# Patient Record
Sex: MALE | Race: WHITE | Employment: OTHER | URBAN - METROPOLITAN AREA
[De-identification: names, ages, dates, MRNs, and addresses within clinical notes are randomized per-mention and may not be internally consistent; named-entity substitution may affect disease eponyms.]

---

## 2018-08-16 ENCOUNTER — HOSPITAL ENCOUNTER (OUTPATIENT)
Dept: PREADMISSION TESTING | Age: 68
Discharge: HOME | End: 2018-08-16
Payer: MEDICARE

## 2018-08-16 DIAGNOSIS — G56.01 CARPAL TUNNEL SYNDROME ON RIGHT: ICD-10-CM

## 2018-08-16 LAB
ATRIAL RATE: 64 BPM
CALCULATED P AXIS, ECG09: 29 DEGREES
CALCULATED R AXIS, ECG10: 50 DEGREES
CALCULATED T AXIS, ECG11: 69 DEGREES
DIAGNOSIS, 93000: NORMAL
HCT VFR BLD AUTO: 41.5 % (ref 36–48)
HGB BLD-MCNC: 13.9 G/DL (ref 13–16)
P-R INTERVAL, ECG05: 204 MS
Q-T INTERVAL, ECG07: 402 MS
QRS DURATION, ECG06: 88 MS
QTC CALCULATION (BEZET), ECG08: 414 MS
VENTRICULAR RATE, ECG03: 64 BPM

## 2018-08-16 PROCEDURE — 93005 ELECTROCARDIOGRAM TRACING: CPT

## 2018-08-16 PROCEDURE — 85018 HEMOGLOBIN: CPT | Performed by: ORTHOPAEDIC SURGERY

## 2018-08-16 PROCEDURE — 36415 COLL VENOUS BLD VENIPUNCTURE: CPT | Performed by: ORTHOPAEDIC SURGERY

## 2018-08-17 LAB
FAX TO INFO,FAXT: NORMAL
FAX TO NUMBER,FAXN: NORMAL

## 2019-12-17 ENCOUNTER — HOSPITAL ENCOUNTER (OUTPATIENT)
Dept: PREADMISSION TESTING | Age: 69
Discharge: HOME | End: 2019-12-17
Payer: MEDICARE

## 2019-12-17 LAB
HCT VFR BLD AUTO: 43.7 % (ref 36–48)
HGB BLD-MCNC: 14.3 G/DL (ref 13–16)

## 2019-12-17 PROCEDURE — 85018 HEMOGLOBIN: CPT

## 2019-12-17 PROCEDURE — 36415 COLL VENOUS BLD VENIPUNCTURE: CPT

## 2019-12-17 PROCEDURE — 93005 ELECTROCARDIOGRAM TRACING: CPT

## 2019-12-18 LAB
ATRIAL RATE: 66 BPM
CALCULATED P AXIS, ECG09: 64 DEGREES
CALCULATED R AXIS, ECG10: -31 DEGREES
CALCULATED T AXIS, ECG11: 54 DEGREES
DIAGNOSIS, 93000: NORMAL
P-R INTERVAL, ECG05: 202 MS
Q-T INTERVAL, ECG07: 406 MS
QRS DURATION, ECG06: 90 MS
QTC CALCULATION (BEZET), ECG08: 425 MS
VENTRICULAR RATE, ECG03: 66 BPM

## 2020-08-19 ENCOUNTER — HOSPITAL ENCOUNTER (OUTPATIENT)
Dept: LAB | Age: 70
Discharge: HOME | End: 2020-08-19
Payer: MEDICARE

## 2020-08-19 ENCOUNTER — HOSPITAL ENCOUNTER (OUTPATIENT)
Dept: NON INVASIVE DIAGNOSTICS | Age: 70
Discharge: HOME | End: 2020-08-19
Payer: MEDICARE

## 2020-08-19 DIAGNOSIS — M65.312 TRIGGER THUMB OF LEFT HAND: ICD-10-CM

## 2020-08-19 LAB
ALBUMIN SERPL-MCNC: 3.7 G/DL (ref 3.4–5)
ALBUMIN/GLOB SERPL: 1.4 {RATIO} (ref 0.8–1.7)
ALP SERPL-CCNC: 60 U/L (ref 45–117)
ALT SERPL-CCNC: 26 U/L (ref 16–61)
ANION GAP SERPL CALC-SCNC: 4 MMOL/L (ref 3–18)
AST SERPL-CCNC: 22 U/L (ref 10–38)
ATRIAL RATE: 64 BPM
BASOPHILS # BLD: 0 K/UL (ref 0–0.1)
BASOPHILS NFR BLD: 1 % (ref 0–2)
BILIRUB SERPL-MCNC: 0.7 MG/DL (ref 0.2–1)
BUN SERPL-MCNC: 22 MG/DL (ref 7–18)
BUN/CREAT SERPL: 25 (ref 12–20)
CALCIUM SERPL-MCNC: 9.2 MG/DL (ref 8.5–10.1)
CALCULATED P AXIS, ECG09: 59 DEGREES
CALCULATED R AXIS, ECG10: 32 DEGREES
CALCULATED T AXIS, ECG11: 55 DEGREES
CHLORIDE SERPL-SCNC: 111 MMOL/L (ref 100–111)
CO2 SERPL-SCNC: 28 MMOL/L (ref 21–32)
CREAT SERPL-MCNC: 0.89 MG/DL (ref 0.6–1.3)
DIAGNOSIS, 93000: NORMAL
DIFFERENTIAL METHOD BLD: ABNORMAL
EOSINOPHIL # BLD: 0.1 K/UL (ref 0–0.4)
EOSINOPHIL NFR BLD: 1 % (ref 0–5)
ERYTHROCYTE [DISTWIDTH] IN BLOOD BY AUTOMATED COUNT: 12.5 % (ref 11.6–14.5)
GLOBULIN SER CALC-MCNC: 2.6 G/DL (ref 2–4)
GLUCOSE SERPL-MCNC: 93 MG/DL (ref 74–99)
HCT VFR BLD AUTO: 41 % (ref 36–48)
HGB BLD-MCNC: 13.5 G/DL (ref 13–16)
LYMPHOCYTES # BLD: 1.8 K/UL (ref 0.9–3.6)
LYMPHOCYTES NFR BLD: 27 % (ref 21–52)
MCH RBC QN AUTO: 31.3 PG (ref 24–34)
MCHC RBC AUTO-ENTMCNC: 32.9 G/DL (ref 31–37)
MCV RBC AUTO: 95.1 FL (ref 74–97)
MONOCYTES # BLD: 0.8 K/UL (ref 0.05–1.2)
MONOCYTES NFR BLD: 13 % (ref 3–10)
NEUTS SEG # BLD: 3.9 K/UL (ref 1.8–8)
NEUTS SEG NFR BLD: 58 % (ref 40–73)
P-R INTERVAL, ECG05: 208 MS
PLATELET # BLD AUTO: 187 K/UL (ref 135–420)
PMV BLD AUTO: 9.2 FL (ref 9.2–11.8)
POTASSIUM SERPL-SCNC: 4.1 MMOL/L (ref 3.5–5.5)
PROT SERPL-MCNC: 6.3 G/DL (ref 6.4–8.2)
Q-T INTERVAL, ECG07: 400 MS
QRS DURATION, ECG06: 92 MS
QTC CALCULATION (BEZET), ECG08: 412 MS
RBC # BLD AUTO: 4.31 M/UL (ref 4.7–5.5)
SODIUM SERPL-SCNC: 143 MMOL/L (ref 136–145)
VENTRICULAR RATE, ECG03: 64 BPM
WBC # BLD AUTO: 6.6 K/UL (ref 4.6–13.2)

## 2020-08-19 PROCEDURE — 93005 ELECTROCARDIOGRAM TRACING: CPT

## 2020-08-19 PROCEDURE — 85025 COMPLETE CBC W/AUTO DIFF WBC: CPT

## 2020-08-19 PROCEDURE — 36415 COLL VENOUS BLD VENIPUNCTURE: CPT

## 2020-08-19 PROCEDURE — 80053 COMPREHEN METABOLIC PANEL: CPT

## 2021-04-25 NOTE — H&P
Assessment/Plan  # Detail Type Description    1. Assessment Encounter for screening for malignant neoplasm of colon (Z12.11). Patient Plan I have discussed the risks, benefits and alternatives of the procedure to the patient including bleeding, infection, bowel prep, perforation missed lesions and incomplete procedure. They understand and wish to proceed         2. Assessment Body mass index (BMI) 26.0-26.9, adult (H62.11). Plan Orders Today's instructions / counseling include(s) Lifestyle education regarding diet. Giving encouragement to exercise              This 80year old male presents for Colon Cancer Screen. History of Present Illness  1. Colon Cancer Screen   Prior screening:  colonoscopy. Denies risk factors. Pertinent negatives include abdominal pain, change in bowel habits, change in stool caliber, constipation, decreased appetite, diarrhea, melena, nausea, rectal bleeding, vomiting, weight gain and weight loss. Additional information: No family history of colon cancer. Problem List  Problem List reviewed. Problem Description Onset Date Chronic Clinical Status Notes   Migraine  Y     Rosacea  Y         Past Medical/Surgical History   (Detailed)  Disease/disorder Onset Date Management Date Comments     Arthroscopy, knee       lipoma removal back       Carpal tunnel release 2018    Headache, migraine           Family History   (Detailed)    Relationship Family Member Name  Age at Death Condition Onset Age Cause of Death   Brother    Diabetes mellitus type 2  N   Father    Cancer, brain 64 Y   Mother    Diabetes mellitus type 2  N   Mother  Y  Stroke 78 Y   Sister    Diabetes mellitus type 2  N     Social History  (Detailed)  The patient is right-handed. Preferred language is [place]. Language spoken at home is [place]. Born in [facility]. Education/Employment/Occupation  The patient has a(n) graduate school education. Earned a Master's degree.   The degree was earned in [place]. Marital Status/Family/Social Support  Marital status:      Children  Has children:    IRIS Chandra 46 status is stable/permanent. Smoking status: Never smoker. Alcohol  There is a history of alcohol use. Type: Beer and liquor. 4 drinks consumed weekly. Last alcoholic drink was last week. Caffeine  The patient uses caffeine: coffee - 2-3 cups a day. Lifestyle  Moderate activity level. Exercise includes walking. Exercises daily. Diet  regular. Home Environment/Safety  The home has smoke detectors. Uses seat belts.  Experience  Patient has no  experience. Medications (active prior to today)  Medication Instructions Start Date Stop Date Refilled Elsewhere   Imitrex 100 mg tablet take 1 tablet by oral route once with fluids at the onset of a migraine, may repeat in 2 hours if needed, not to exceed 200mg in 24hrs 03/08/2019 03/08/2019 N   Ambien 10 mg tablet take 1 Tablet by Oral route  at bedtime as needed for insomnia 08/29/2019   N   EpiPen 2-Kendell 0.3 mg/0.3 mL injection, auto-injector inject (0.3MG)  by intramuscular route once as needed for severe allergic reaction 11/20/2019   N       Medication Reconciliation  Medications reconciled today. Medication Reviewed  Adherence Medication Name Sig Desc Elsewhere Status   taking as directed Ambien 10 mg tablet take 1 Tablet by Oral route  at bedtime as needed for insomnia N Verified   taking as directed Imitrex 100 mg tablet take 1 tablet by oral route once with fluids at the onset of a migraine, may repeat in 2 hours if needed, not to exceed 200mg in 24hrs N Verified   taking as directed EpiPen 2-Kendell 0.3 mg/0.3 mL injection, auto-injector inject (0.3MG)  by intramuscular route once as needed for severe allergic reaction N Verified     Allergies  Ingredient Reaction (Severity) Medication Name Comment   NO KNOWN ALLERGIES        Reviewed, no changes.     Review of Systems  System Neg/Pos Details   Constitutional Negative Fever, Night sweats, Weight gain and Weight loss. ENMT Negative Hearing loss, Tinnitus, Vertigo and Voice change. Eyes Negative Diplopia and Vision loss. Respiratory Negative Asthma, Cough, Dyspnea, Hemoptysis, Known TB exposure and Wheezing. Cardio Negative Chest pain, Claudication, Edema, Irregular heartbeat/palpitations and Thrombophlebitis. GI Negative Abdominal pain, Bloating, Change in bowel habits, Change in stool caliber, Constipation, Decreased appetite, Diarrhea, Dysphagia, Hemorrhoids, Jaundice, Melena, Nausea, Rectal bleeding, Reflux and Vomiting.  Negative Dysuria, Nocturia, Passage stone/gravel and Urinary incontinence. Endocrine Negative Cold intolerance and Goiter. Neuro Negative Focal weakness, Headache, Paresthesia, Seizures and Syncope. Integumentary Negative Change in shape/size of mole(s) and Skin lesion. MS Negative Back pain, Bone/joint symptoms and Muscle weakness. Hema/Lymph Negative Easy bleeding and Easy bruising. Allergic/Immuno Negative Contact allergy and Contact dermatitis. Vital Signs   Height  Time ft in cm Last Measured Height Position   10:12 AM 5.0 8.00 172.72 03/06/2017 Standing     Weight/BSA/BMI  Time lb oz kg Context BMI kg/m2 BSA m2   10:12 .60  78.290 dressed with shoes 26.24 1.94     Blood Pressure  Time BP mm/Hg Position Side Site Method Cuff Size   10:12 /88 sitting left brachial automatic adult     Temperature/Pulse/Respiration  Time Temp F Temp C Temp Site Pulse/min Pattern Resp/ min   10:12 AM 96.80 36.00 temp strip 83 regular      Pulse Oximetry/FIO2  Time Pulse Ox (Rest %) Pulse Ox (Amb %) O2 Sat O2 L/Min Timing FiO2 % L/min Delivery Method Finger Probe   10:12 AM 98  RA      R Index     Measured by  Time Measured by   10:12 AM Eddie Haq     Physical Exam  Exam Findings Details   Constitutional Normal Well developed.    Eyes Normal Conjunctiva - Right: Normal, Left: Normal. Pupil - Right: Normal, Left: Normal.   Ears Normal Inspection - Right: Normal, Left: Normal.   Neck Exam Normal Inspection - Normal. Palpation - Normal. Thyroid gland - Normal.   Lymph Detail Normal No cervical, supraclavicular, or axillary adenopathy. Respiratory Normal Inspection - Normal. Auscultation - Normal. Effort - Normal.   Cardiovascular Normal Regular rate and rhythm. No murmurs, gallops, or rubs. Vascular Normal Capillary refill - Less than 2 seconds. Abdomen Normal Inspection - Normal. No abdominal tenderness. No hepatic enlargement. No spleen enlargement. No hernia. Skin Normal Inspection - Normal.   Musculoskeletal Normal Visual overview of all four extremities is normal.   Extremity Normal No edema. Neurological Normal Memory - Normal. Cranial nerves - Cranial nerves I grossly intact, Cranial nerves II through XII grossly intact. Psychiatric Normal Orientation - Oriented to time, place, person & situation. Appropriate mood and affect. Normal insight. Normal judgment. Immunizations Entered by History  Date Immunization   3/5/2021 12:00:00 AM SARS-COV-2 (COVID-19) vaccine, mRNA, spike protein, LNP, preservative free, 100 mcg/0.5mL dose   2/3/2021 12:00:00 AM SARS-COV-2 (COVID-19) vaccine, mRNA, spike protein, LNP, preservative free, 100 mcg/0.5mL dose    influenza, high-dose seasonal, quadrivalent, . 7mL dose, preservative free   10/26/2019 12:00:00 AM influenza, injectable, quadrivalent, contains preservative   11/3/2015 12:00:00 AM Flu (3 yrs or older)   6/16/2016 12:52:34 PM yellow fever vaccine   6/16/2016 12:52:59 PM typhoid Vi capsular polysaccharide vaccine   6/16/2016 12:53:25 PM Pneumo (2 yrs or older) (PPV23)         Medications (added, continued, or stopped this visit)  Start Date Medication Directions PRN Status PRN Reason Instruction Stop Date   08/29/2019 Ambien 10 mg tablet take 1 Tablet by Oral route  at bedtime as needed for insomnia N      11/20/2019 EpiPen 2-Kendell 0.3 mg/0.3 mL injection, auto-injector inject (0.3MG)  by intramuscular route once as needed for severe allergic reaction N      03/08/2019 Imitrex 100 mg tablet take 1 tablet by oral route once with fluids at the onset of a migraine, may repeat in 2 hours if needed, not to exceed 200mg in 24hrs N        Active Patient Care Team Members  Name Contact Agency Type Support Role Relationship Active Date Inactive Date Specialty   Guido Cristina   Patient provider PCP   Family Practice       Provider:    Madelaine Das MD 04/13/2021 12:27 PM     Document generated by: Deng Manning 04/13/2021 12:27 PM         Electronically signed by Deng Manning MD on 04/13/2021 01:37 PM

## 2021-04-26 ENCOUNTER — HOSPITAL ENCOUNTER (OUTPATIENT)
Dept: PREADMISSION TESTING | Age: 71
Discharge: HOME | End: 2021-04-26
Payer: MEDICARE

## 2021-04-26 PROCEDURE — U0003 INFECTIOUS AGENT DETECTION BY NUCLEIC ACID (DNA OR RNA); SEVERE ACUTE RESPIRATORY SYNDROME CORONAVIRUS 2 (SARS-COV-2) (CORONAVIRUS DISEASE [COVID-19]), AMPLIFIED PROBE TECHNIQUE, MAKING USE OF HIGH THROUGHPUT TECHNOLOGIES AS DESCRIBED BY CMS-2020-01-R: HCPCS

## 2021-04-27 LAB — SARS-COV-2, COV2NT: NOT DETECTED

## 2021-04-30 ENCOUNTER — ANESTHESIA EVENT (OUTPATIENT)
Dept: ENDOSCOPY | Age: 71
End: 2021-04-30
Payer: MEDICARE

## 2021-04-30 ENCOUNTER — HOSPITAL ENCOUNTER (OUTPATIENT)
Age: 71
Setting detail: OUTPATIENT SURGERY
Discharge: HOME | End: 2021-04-30
Attending: SURGERY | Admitting: SURGERY
Payer: MEDICARE

## 2021-04-30 ENCOUNTER — ANESTHESIA (OUTPATIENT)
Dept: ENDOSCOPY | Age: 71
End: 2021-04-30
Payer: MEDICARE

## 2021-04-30 VITALS
SYSTOLIC BLOOD PRESSURE: 118 MMHG | TEMPERATURE: 97.8 F | DIASTOLIC BLOOD PRESSURE: 76 MMHG | HEART RATE: 76 BPM | RESPIRATION RATE: 18 BRPM | HEIGHT: 68 IN | OXYGEN SATURATION: 100 % | BODY MASS INDEX: 25.01 KG/M2 | WEIGHT: 165 LBS

## 2021-04-30 PROCEDURE — 88305 TISSUE EXAM BY PATHOLOGIST: CPT

## 2021-04-30 PROCEDURE — 74011000250 HC RX REV CODE- 250: Performed by: ANESTHESIOLOGY

## 2021-04-30 PROCEDURE — 74011250636 HC RX REV CODE- 250/636: Performed by: ANESTHESIOLOGY

## 2021-04-30 PROCEDURE — 77030039961 HC KT CUST COLON BSC -D: Performed by: SURGERY

## 2021-04-30 PROCEDURE — 2709999900 HC NON-CHARGEABLE SUPPLY: Performed by: SURGERY

## 2021-04-30 PROCEDURE — 77030021593 HC FCPS BIOP ENDOSC BSC -A: Performed by: SURGERY

## 2021-04-30 PROCEDURE — 77030040361 HC SLV COMPR DVT MDII -B: Performed by: SURGERY

## 2021-04-30 PROCEDURE — 76060000031 HC ANESTHESIA FIRST 0.5 HR: Performed by: SURGERY

## 2021-04-30 PROCEDURE — 76040000019: Performed by: SURGERY

## 2021-04-30 RX ORDER — SODIUM CHLORIDE 9 MG/ML
125 INJECTION, SOLUTION INTRAVENOUS CONTINUOUS
Status: DISCONTINUED | OUTPATIENT
Start: 2021-04-30 | End: 2021-04-30 | Stop reason: HOSPADM

## 2021-04-30 RX ORDER — LIDOCAINE HYDROCHLORIDE 20 MG/ML
INJECTION, SOLUTION EPIDURAL; INFILTRATION; INTRACAUDAL; PERINEURAL AS NEEDED
Status: DISCONTINUED | OUTPATIENT
Start: 2021-04-30 | End: 2021-04-30 | Stop reason: HOSPADM

## 2021-04-30 RX ORDER — ACETAMINOPHEN 325 MG/1
650 TABLET ORAL
COMMUNITY

## 2021-04-30 RX ORDER — PROPOFOL 10 MG/ML
INJECTION, EMULSION INTRAVENOUS AS NEEDED
Status: DISCONTINUED | OUTPATIENT
Start: 2021-04-30 | End: 2021-04-30 | Stop reason: HOSPADM

## 2021-04-30 RX ADMIN — PROPOFOL 20 MG: 10 INJECTION, EMULSION INTRAVENOUS at 09:46

## 2021-04-30 RX ADMIN — PROPOFOL 50 MG: 10 INJECTION, EMULSION INTRAVENOUS at 09:43

## 2021-04-30 RX ADMIN — PROPOFOL 20 MG: 10 INJECTION, EMULSION INTRAVENOUS at 09:49

## 2021-04-30 RX ADMIN — LIDOCAINE HYDROCHLORIDE 20 MG: 20 INJECTION, SOLUTION INTRAVENOUS at 09:43

## 2021-04-30 RX ADMIN — PROPOFOL 20 MG: 10 INJECTION, EMULSION INTRAVENOUS at 09:53

## 2021-04-30 NOTE — OP NOTES
Colonoscopy Procedure Note    Indications: Routine screening    Anesthesia/Sedation: MAC anesthesia Propofol    Pre-Procedure Exam:  Airway: clear   Heart: normal S1and S2    Lungs: clear bilateral  Abdomen: soft, nontender, bowel sounds present and normal in all quadrants   Mental Status: awake, alert, and oriented to person, place, and time      Procedure in Detail:  Informed consent was obtained for the procedure, including sedation. Risks of perforation, hemorrhage, adverse drug reaction, and aspiration were discussed. The patient was placed in the left lateral decubitus position. Based on the pre-procedure assessment, including review of the patient's medical history, medications, allergies, and review of systems, he had been deemed to be an appropriate candidate for moderate sedation; he was therefore sedated with the medications listed above. The patient was monitored continuously with ECG tracing, pulse oximetry, blood pressure monitoring, and direct observations. A rectal examination was performed. The [id number] was inserted into the rectum and advanced under direct vision to the cecum, which was identified by the ileocecal valve and appendiceal orifice. The quality of the colonic preparation was good. A careful inspection was made as the colonoscope was withdrawn, including a retroflexed view of the rectum; findings and interventions are described below. Appropriate photodocumentation was obtained. Findings:   Rectum:   Normal  Sigmoid:   Normal  Descending Colon:     - Protruding lesions:     -Sessile Polyp(s) size 2 mm removed by polypectomy (cold biopsy)  Transverse Colon:   Normal  Ascending Colon:   Normal  Cecum:   Normal  Terminal Ileum:   Not entered      Specimens: Specimens were collected and sent to pathology. EBL: Minimal    Complications: None; patient tolerated the procedure well. Attending Attestation: I performed the procedure.     Assistant:  None    Implants:  * No implants in log *    Recommendations:   - Await pathology.     Signed By: Haley Aguilar MD                       4/30/2021

## 2021-04-30 NOTE — INTERVAL H&P NOTE
Update History & Physical    The Patient's History and Physical of April 30,    was reviewed with the patient and I examined the patient. There was no change. The surgical site was confirmed by the patient and me. Plan:  The risk, benefits, expected outcome, and alternative to the recommended procedure have been discussed with the patient. Patient understands and wants to proceed with the procedure.     Electronically signed by Aniket Chung MD on 4/30/2021 at 9:31 AM

## 2021-04-30 NOTE — ANESTHESIA POSTPROCEDURE EVALUATION
Procedure(s):  COLONOSCOPY WITH MAC; POLYPECTOMY. MAC    Anesthesia Post Evaluation        Comments: Post-Anesthesia Evaluation and Assessment    Cardiovascular Function/Vital Signs  /76   Pulse 76   Temp 36.6 °C (97.8 °F)   Resp 18   Ht 5' 8\" (1.727 m)   Wt 74.8 kg (165 lb)   SpO2 100%   BMI 25.09 kg/m²     Patient is status post Procedure(s):  COLONOSCOPY WITH MAC; POLYPECTOMY. Nausea/Vomiting: Controlled. Postoperative hydration reviewed and adequate. Pain:  Pain Scale 1: Numeric (0 - 10) (04/30/21 1015)  Pain Intensity 1: 0 (04/30/21 1015)   Managed. Neurological Status: At baseline. Mental Status and Level of Consciousness: Arousable. Pulmonary Status:   O2 Device: None (Room air) (04/30/21 1015)   Adequate oxygenation and airway patent. Complications related to anesthesia: None    Post-anesthesia assessment completed. No concerns. Patient has met all discharge requirements.     Signed By: Agata Harrison MD    April 30, 2021                   INITIAL Post-op Vital signs:   Vitals Value Taken Time   /76 04/30/21 1015   Temp 36.6 °C (97.8 °F) 04/30/21 1015   Pulse 76 04/30/21 1015   Resp 18 04/30/21 1015   SpO2 100 % 04/30/21 1015

## 2021-04-30 NOTE — ANESTHESIA PREPROCEDURE EVALUATION
Relevant Problems   No relevant active problems       Anesthetic History   No history of anesthetic complications            Review of Systems / Medical History  Patient summary reviewed, nursing notes reviewed and pertinent labs reviewed    Pulmonary  Within defined limits                 Neuro/Psych   Within defined limits           Cardiovascular                  Exercise tolerance: >4 METS     GI/Hepatic/Renal  Within defined limits              Endo/Other  Within defined limits           Other Findings              Physical Exam    Airway  Mallampati: II  TM Distance: 4 - 6 cm  Neck ROM: normal range of motion, short neck   Mouth opening: Diminished (comment)     Cardiovascular               Dental    Dentition: Bridges     Pulmonary                 Abdominal  GI exam deferred       Other Findings            Anesthetic Plan    ASA: 1  Anesthesia type: MAC          Induction: Intravenous  Anesthetic plan and risks discussed with: Patient

## 2021-04-30 NOTE — BRIEF OP NOTE
Brief Postoperative Note    Patient: Caty Lara  YOB: 1950  MRN: [id number]    Date of Procedure: 4/30/2021     Pre-Op Diagnosis: COLON CANCER SCREENING    Post-Op Diagnosis: Same as preoperative diagnosis. Procedure(s):  COLONOSCOPY WITH MAC; POLYPECTOMY    Surgeon(s):   Gary Toure MD    Surgical Assistant: None    Anesthesia: MAC     Estimated Blood Loss (mL): Minimal    Complications: None    Specimens:   ID Type Source Tests Collected by Time Destination   1 : POLYP Preservative Colon, Descending  Gary Toure MD 4/30/2021 9947 Pathology        Implants: * No implants in log *    Drains: * No LDAs found *    Findings: polyp    Electronically Signed by Brian Xiong MD on 4/30/2021 at 10:00 AM

## 2021-04-30 NOTE — DISCHARGE INSTRUCTIONS
Nasim James  [id number]  1950    COLON DISCHARGE INSTRUCTIONS    Discomfort:  Redness at IV site- apply warm compress to area; if redness or soreness persist- contact your physician  There may be a slight amount of blood passed from the rectum  Gaseous discomfort- walking, belching will help relieve any discomfort  You should not operate a vehicle for 12 hours  You should not engage in an occupation involving machinery or appliances for rest of today  You may not drink alcoholic beverages for at least 12 hours  Avoid making any critical decisions for at least 24 hour  DIET:   High fiber diet. - however -  remember your colon is empty and a heavy meal will produce gas. Avoid these foods:  vegetables, fried / greasy foods, carbonated drinks for today     ACTIVITY:  You may resume your normal daily activities it is recommended that you spend the remainder of the day resting -  avoid any strenuous activity. CALL M.D. ANY SIGN OF:   Increasing pain, nausea, vomiting  Abdominal distension (swelling)  New increased bleeding (oral or rectal)  Fever (chills)  Pain in chest area  Bloody discharge from nose or mouth  Shortness of breath      Follow-up Instructions:   Dr Trevon Dailey will call you in one to two weeks. If you do not hear from him, please call his office [phone].                           Nasim James  [id number]  1950        DISCHARGE SUMMARY from Nurse    The following personal items collected during your admission are returned to you:   Dental Appliance: Dental Appliances: None  Vision: Visual Aid: Glasses, With patient  Hearing Aid:    Jewelry:    Clothing:    Other Valuables:    Valuables sent to safe:        DISCHARGE SUMMARY from Nurse    PATIENT INSTRUCTIONS:    After general anesthesia or intravenous sedation, for 24 hours or while taking prescription Narcotics:  · Limit your activities  · Do not drive and operate hazardous machinery  · Do not make important personal or business decisions  · Do  not drink alcoholic beverages  · If you have not urinated within 8 hours after discharge, please contact your surgeon on call. Report the following to your surgeon:  · Excessive pain, swelling, redness or odor of or around the surgical area  · Temperature over 100.5  · Nausea and vomiting lasting longer than 4 hours or if unable to take medications  · Any signs of decreased circulation or nerve impairment to extremity: change in color, persistent  numbness, tingling, coldness or increase pain  · Any questions    What to do at Home:  Recommended activity: AS ABOVE,     If you experience any of the following symptoms AS ABOVE, please follow up with DR. BURGESS. *  Please give a list of your current medications to your Primary Care Provider. *  Please update this list whenever your medications are discontinued, doses are      changed, or new medications (including over-the-counter products) are added. *  Please carry medication information at all times in case of emergency situations. These are general instructions for a healthy lifestyle:    No smoking/ No tobacco products/ Avoid exposure to second hand smoke  Surgeon General's Warning:  Quitting smoking now greatly reduces serious risk to your health. Obesity, smoking, and sedentary lifestyle greatly increases your risk for illness    A healthy diet, regular physical exercise & weight monitoring are important for maintaining a healthy lifestyle    You may be retaining fluid if you have a history of heart failure or if you experience any of the following symptoms:  Weight gain of 3 pounds or more overnight or 5 pounds in a week, increased swelling in our hands or feet or shortness of breath while lying flat in bed. Please call your doctor as soon as you notice any of these symptoms; do not wait until your next office visit. The discharge information has been reviewed with the patient.   The patient verbalized understanding. Discharge medications reviewed with the patient and appropriate educational materials and side effects teaching were provided.   ___________________________________________________________________________________________________________________________________      Patient armband removed and shredded

## 2021-08-31 ENCOUNTER — TRANSCRIBE ORDER (OUTPATIENT)
Dept: REGISTRATION | Age: 71
End: 2021-08-31

## 2021-08-31 ENCOUNTER — HOSPITAL ENCOUNTER (OUTPATIENT)
Dept: LAB | Age: 71
Discharge: HOME | End: 2021-08-31
Payer: MEDICARE

## 2021-08-31 ENCOUNTER — HOSPITAL ENCOUNTER (OUTPATIENT)
Dept: NON INVASIVE DIAGNOSTICS | Age: 71
Discharge: HOME | End: 2021-08-31
Payer: MEDICARE

## 2021-08-31 DIAGNOSIS — E87.6 POTASSIUM (K) DEFICIENCY: ICD-10-CM

## 2021-08-31 DIAGNOSIS — Z01.812 BLOOD TESTS PRIOR TO TREATMENT OR PROCEDURE: ICD-10-CM

## 2021-08-31 DIAGNOSIS — M65.349: ICD-10-CM

## 2021-08-31 DIAGNOSIS — Z01.812 BLOOD TESTS PRIOR TO TREATMENT OR PROCEDURE: Primary | ICD-10-CM

## 2021-08-31 DIAGNOSIS — M65.349: Primary | ICD-10-CM

## 2021-08-31 DIAGNOSIS — G40.802: Primary | ICD-10-CM

## 2021-08-31 DIAGNOSIS — G40.802: ICD-10-CM

## 2021-08-31 LAB
ATRIAL RATE: 68 BPM
CALCULATED P AXIS, ECG09: 34 DEGREES
CALCULATED R AXIS, ECG10: -45 DEGREES
CALCULATED T AXIS, ECG11: 51 DEGREES
DIAGNOSIS, 93000: NORMAL
HCT VFR BLD AUTO: 42.7 % (ref 36–48)
HGB BLD-MCNC: 14.3 G/DL (ref 13–16)
P-R INTERVAL, ECG05: 206 MS
POTASSIUM SERPL-SCNC: 4 MMOL/L (ref 3.5–5.5)
Q-T INTERVAL, ECG07: 388 MS
QRS DURATION, ECG06: 80 MS
QTC CALCULATION (BEZET), ECG08: 412 MS
VENTRICULAR RATE, ECG03: 68 BPM

## 2021-08-31 PROCEDURE — 85018 HEMOGLOBIN: CPT

## 2021-08-31 PROCEDURE — 84132 ASSAY OF SERUM POTASSIUM: CPT

## 2021-08-31 PROCEDURE — 36415 COLL VENOUS BLD VENIPUNCTURE: CPT

## 2021-08-31 PROCEDURE — 93005 ELECTROCARDIOGRAM TRACING: CPT

## 2022-11-09 ENCOUNTER — HOSPITAL ENCOUNTER (OUTPATIENT)
Dept: PHYSICAL THERAPY | Age: 72
Discharge: HOME | End: 2022-11-09
Payer: MEDICARE

## 2022-11-09 PROCEDURE — 97110 THERAPEUTIC EXERCISES: CPT

## 2022-11-09 PROCEDURE — 97161 PT EVAL LOW COMPLEX 20 MIN: CPT

## 2022-11-09 NOTE — PROGRESS NOTES
PT DAILY TREATMENT NOTE/LUMBAR EVAL 10-18    Patient Name: Diane Mancera  Date:2022  : 1950  [x]  Patient  Verified  Payor: Mandeep Grew / Plan: [place] & [place] / Product Type: Medicare /    In [id number]  Out time:1020  Total Treatment Time (min): 50  Visit #: 1 of 16    Medicare/BCBS Only   Total Timed Codes (min):  23 1:1 Treatment Time:  50     Treatment Area: Other low back pain [M54.59]  SUBJECTIVE  Pain Level (0-10 scale): 0-1  []constant [x]intermittent []improving []worsening []no change since onset    Any medication changes, allergies to medications, adverse drug reactions, diagnosis change, or new procedure performed?: [x] No    [] Yes (see summary sheet for update)  Subjective functional status/changes:       Patient presents with c/o low back pain and bilateral radiculopathy, right more than left, after performing bird dog position in yoga class May 2022. Patient describes pain as sharp, stabbing, ache. Pain is worse in AM. Reports numbness/tingling in right LE and foot. Denies popping/clicking. Aggravating factors:rolling over in bed. Alleviating factors: stretch, yoga. Denies red flags: SOB, chest pain, dizziness/lightheadedness, blurred/double vision, HA, chills/fevers, night sweats, change in bowel/bladder control, abdominal pain, difficulty swallowing, slurred speech, unexplained weight gain/loss, nausea, vomiting. PMHx: migraines. Surgical Hx: carpal tunnel, trigger finger release. Social Hx: Lives with spouse in a two story home, work status: retired. PLOF: Yoga, regular exercise program, active life style.   Diagnostic Imaging: Bone spur presents at L5    OBJECTIVE/EXAMINATION    37 min [x]Eval                  []Re-Eval     23 min Therapeutic Exercise:  [x] See flow sheet :   Rationale: increase ROM, increase strength and decrease pain to improve the patients ability to complete ADLs          With   [x] TE   [] TA   [] neuro   [] other: Patient Education: [x] Review HEP [] Progressed/Changed HEP based on:   [] positioning   [] body mechanics   [] transfers   [] heat/ice application    [] other:      Physical Therapy Evaluation - Lumbar Spine    OBJECTIVE  Posture: Forward head and rounded shoulders    Gait:  [] Normal     [x] Abnormal: Patient ambulates with independence demonstrating an antalgic gait pattern. Active Movements: [] N/A   [] Too acute   [] Other:  ROM % AROM Comments:pain, area   Forward flexion 40-60 100    Extension 20-30 75    SB right 20-30 100    SB left 20-30 100    Rotation right 5-10 50    Rotation left 5-10 50      Repeated Movements   Effects on present pain: produces (VT), abolishes (A), increases (incr), decreases (decr), centralizes (C), peripheral (PH), no effect (NE)   Pre-Test Sx Flexion Repeated Flexion Extension Repeated Extension Repeated SBL Repeated SBR   Sitting          Standing  NE NE NE NE NE NE   Lying      N/A N/A     Neuro Screen [x] WNL    Dural Mobility:  SLR Supine: [] R    [] L    [] +    [x] -    Slump Test: [] R    [] L    [] +    [x] -      Palpation  [] Min  [x] Mod  [] Severe    Location:    Strength   L(0-5) R (0-5) N/T   Hip Flexion (L1,2) 4 4- []   Knee Extension (L3,4) 5 4+ []   Ankle Dorsiflexion (L4) 5 3+ []   Great Toe Extension (L5) 4+ 4- []   Ankle Plantarflexion (S1) 4 4- []   Knee Flexion (S1,2) 5 4+ []   Abdominals 4 4 []   Paraspinals 4 4 []   Back Rotators 4 4 []   Gluteus Santiago 4- 4- []   Hip Abduction 4- 4- []       Special Tests  Lumbar:  Lumb.  Compression: [] Pos  [x] Neg               Lumbar Distraction:   [] Pos  [x] Neg      Sacroiliac:  Gaenslen's: [] R    [] L    [] +    [x] -     Compression: [] +    [x] -     Gapping:  [] +    [x] -     Thigh Thrust: [] R    [] L    [] +    [x] -          Hip: Lora Redhead:  [] R    [] L    [] +    [x] -     Scour:  [] R    [] L    [] +    [x] -     Piriformis: [x] R    [] L    [x] +    [] -     Other tests/comments:  Patient presents with neurogenic weakness during dorsiflexion     Pain Level (0-10 scale) post treatment: 0    ASSESSMENT/Changes in Function:     Patient presents with c/o low back pain and bilateral radiculopathy, right more than left, after performing bird dog position in yoga class May 2022. He has reduced abdominal strength and bilateral LEs, right more than left. Weakness is present with right foot/ankle dorsiflexion and becomes progressively weaker when tested multiple times which may be consistent with neurogenic weakness. Patient has reduced balance and ambulates with an antalgic gait pattern. Patient presentation is consistent with lumbar radiculopathy. Patient will benefit from skilled PT services to modify and progress therapeutic interventions, address functional mobility deficits, address ROM deficits, address strength deficits, analyze and address soft tissue restrictions, analyze and cue movement patterns, analyze and modify body mechanics/ergonomics and assess and modify postural abnormalities to attain his goals.      [x]  See Plan of Care  []  See progress note/recertification  []  See Discharge Summary         Progress towards goals / Updated goals:  See POC    PLAN  []  Upgrade activities as tolerated     [x]  Continue plan of care  []  Update interventions per flow sheet       []  Discharge due to:_  []  Other:_      Dameon Link PT, DPT 11/9/2022  9:25 AM

## 2022-11-09 NOTE — PROGRESS NOTES
[facility] at [address] [place] [address]  Phone: [phone]   Fax: [phone]    Plan of Care/ Statement of Necessity for Physical Therapy Services     Patient name: Marisol Morillo Start of Care: 2022   Referral source: Cecil Parham MD : 1950    Medical Diagnosis: Other low back pain [M54.59]   Onset Date:May 2022    Treatment Diagnosis: Low back pain   Prior Hospitalization: see medical history Provider#: [id number]   Medications: Verified on Patient summary List    Comorbidities: Migraines   Prior Level of Function:  Yoga, regular exercise program, active life style    The Plan of Care and following information is based on the information from the initial evaluation. Assessment/ key information: Patient presents with c/o low back pain and bilateral radiculopathy, right more than left, after performing bird dog position in yoga class May 2022. He has reduced abdominal strength and bilateral LEs, right more than left. Weakness is present with right foot/ankle dorsiflexion and becomes progressively weaker when tested multiple times which may be consistent with neurogenic weakness. Patient has reduced balance and ambulates with an antalgic gait pattern. Patient presentation is consistent with lumbar radiculopathy. Patient will benefit from skilled PT services to modify and progress therapeutic interventions, address functional mobility deficits, address ROM deficits, address strength deficits, analyze and address soft tissue restrictions, analyze and cue movement patterns, analyze and modify body mechanics/ergonomics and assess and modify postural abnormalities to attain his goals.     Evaluation Complexity History MEDIUM  Complexity : 1-2 comorbidities / personal factors will impact the outcome/ POC ; Examination LOW Complexity : 1-2 Standardized tests and measures addressing body structure, function, activity limitation and / or participation in recreation  ;Presentation LOW Complexity : Stable, uncomplicated  ;Clinical Decision Making MEDIUM Complexity : FOTO score of 26-74  Overall Complexity Rating: LOW   Problem List: pain affecting function, decrease ROM, decrease strength, impaired gait/ balance, decrease ADL/ functional abilitiies, decrease activity tolerance, decrease flexibility/ joint mobility, and decrease transfer abilities   Treatment Plan may include any combination of the following: Therapeutic exercise, Neuromuscular reeducation, Manual therapy, Therapeutic activity, Self care/home management, Electric stim unattended , Gait training, Mechanical traction, and Electric stim attended  Patient / Family readiness to learn indicated by: asking questions, trying to perform skills, and interest  Persons(s) to be included in education: patient (P)  Barriers to Learning/Limitations: None  Measures taken if barriers to learning: NA  Patient Goal (s): Strengthen lower back, relieve L5 nerve, eliminate numbness/tingling  Patient Self Reported Health Status: excellent  Rehabilitation Potential: good    Short Term Goals: To be accomplished in 4 weeks:   Patient will report compliance with HEP 1x/day to aid in rehabilitation program.   Status at IE: Provided initial HEP   Current:Same as IE    Long Term Goals: To be accomplished in 8 weeks:   Patient will increase bilateral LE strength to 5/5 MMT throughout to aid in completion of ADLs. Status at IE:4-/5   Current:Same as IE     Patient will report pain no greater than 0-2/10 throughout entire day to aid in completion of ADLs. Status at IE:1-10/10   Current:Same as IE     Patient will perform 10 pain free kettle bell squats with 15lbs with good form/technique to aid in completion of ADLs. Status at [address] with sit to stand transfer   Current:Same as IE     Patient will improve FOTO score to 74 points to demonstrate improvement in functional status.    Status at IE:FOTO score = 67 (an established functional score where 100 = no disability)   Current: Same as IE    Frequency / Duration: Patient to be seen 2 times per week for 8 weeks. Patient/ Caregiver education and instruction: Diagnosis, prognosis, self care, activity modification, and exercises   [x]  Plan of care has been reviewed with PTA    Certification Period: 11/9/2022 - 2/7/2023    Osman Mercado PT, DPT 11/9/2022 3:09 PM  _____________________________________________________________________  I certify that the above Therapy Services are being furnished while the patient is under my care. I agree with the treatment plan and certify that this therapy is necessary.     Physician's Signature:____________Date:_________TIME:________                                      Ling Floyd MD    ** Signature, Date and Time must be completed for valid certification **    Please sign and return to   [facility] at [address]  Phone: [phone]   Fax: [phone]

## 2022-11-15 ENCOUNTER — HOSPITAL ENCOUNTER (OUTPATIENT)
Dept: PHYSICAL THERAPY | Age: 72
Discharge: HOME | End: 2022-11-15
Payer: MEDICARE

## 2022-11-15 PROCEDURE — 97530 THERAPEUTIC ACTIVITIES: CPT

## 2022-11-15 PROCEDURE — 97110 THERAPEUTIC EXERCISES: CPT

## 2022-11-15 PROCEDURE — 97112 NEUROMUSCULAR REEDUCATION: CPT

## 2022-11-15 NOTE — PROGRESS NOTES
PT DAILY TREATMENT NOTE - [facility]     Patient Name: Anuj Fenton  Date:11/15/2022  : 1950  [x]  Patient  Verified  Payor: Rk Olsen / Plan: [place] / Product Type: Medicare /    In ZFYI:[id number]  Out time:1238  Total Treatment Time (min): 53  Total Timed Codes (min): 53   1:1 Treatment Time ( [address] only): 53   Visit #: 2 of 16    Treatment Area: Other low back pain [M54.59]    SUBJECTIVE  Pain Level (0-10 scale): 1  Any medication changes, allergies to medications, adverse drug reactions, diagnosis change, or new procedure performed?: [x] No    [] Yes (see summary sheet for update)  Subjective functional status/changes:   [] No changes reported    Patient reports compliance with HEP but would like to review the program to ensure proper form. OBJECTIVE    30 min Therapeutic Exercise:  [x] See flow sheet :   Rationale: increase ROM, increase strength and decrease pain to improve the patients ability to complete ADLs    15 min Therapeutic Activity:  [x]  See flow sheet :   Rationale: increase ROM, increase strength and improve coordination  to improve the patients ability to complete ADLs     8 min Neuromuscular Re-education:  [x]  See flow sheet :   Rationale: improve coordination, improve balance and increase proprioception  to improve the patients ability to complete ADLs        With   [x] TE   [] TA   [] neuro   [] other: Patient Education: [x] Review HEP    [] Progressed/Changed HEP based on:   [] positioning   [] body mechanics   [] transfers   [] heat/ice application    [] other:      Other Objective/Functional Measures: NA     Pain Level (0-10 scale) post treatment: 0    ASSESSMENT/Changes in Function: Patient responds well to treatment session. Provided cues to promote proper  with PPT as patient was utilizing LEs to perform the exercise. He demonstrated improved PPT following cues. Patient had good response to posterior hip isometric reporting a reduction in symptoms. Reviewed HEP to promote good carryover at home. No adverse effects were noted from today's treatment session      Patient will continue to benefit from skilled PT services to modify and progress therapeutic interventions, address functional mobility deficits, address ROM deficits, address strength deficits, analyze and address soft tissue restrictions, analyze and cue movement patterns, analyze and modify body mechanics/ergonomics, assess and modify postural abnormalities, address imbalance and instruct in home and community integration to attain remaining goals. []  See Plan of Care  []  See progress note/recertification  []  See Discharge Summary         Progress towards goals / Updated goals:  Short Term Goals: To be accomplished in 4 weeks:                 Patient will report compliance with HEP 1x/day to aid in rehabilitation program.                 Status at IE: Provided initial HEP                 Current: In-progress, reviewed HEP to proper good carryover at home, 11/15/2022     Long Term Goals: To be accomplished in 8 weeks:                 Patient will increase bilateral LE strength to 5/5 MMT throughout to aid in completion of ADLs. Status at IE:4-/5                 Current:Same as IE                    Patient will report pain no greater than 0-2/10 throughout entire day to aid in completion of ADLs. Status at IE:1-10/10                 Current:Same as IE                    Patient will perform 10 pain free kettle bell squats with 15lbs with good form/technique to aid in completion of ADLs. Status at [address] with sit to stand transfer                 Current:Same as IE                    Patient will improve FOTO score to 74 points to demonstrate improvement in functional status.                  Status at IE:FOTO score = 67 (an established functional score where 100 = no disability)                 Current: Same as IE    PLAN  []  Upgrade activities as tolerated     [x]  Continue plan of care  []  Update interventions per flow sheet       []  Discharge due to:_  []  Other:_      Shyann Will PT, DPT 11/15/2022  12:06 PM    Future Appointments   Date Time Provider Macho Jaquez   11/17/2022  8:00 AM Brain Collet, PT [facility]   11/22/2022  9:30 AM Brain Collet, PT [facility]   11/29/2022  9:30 AM Brain Collet, PT [facility]   12/1/2022  1:15 PM Brain Collet, PT [facility]   12/5/2022  8:45 AM Brain Collet, PT [facility]

## 2022-11-17 ENCOUNTER — HOSPITAL ENCOUNTER (OUTPATIENT)
Dept: PHYSICAL THERAPY | Age: 72
Discharge: HOME | End: 2022-11-17
Payer: MEDICARE

## 2022-11-17 PROCEDURE — 97112 NEUROMUSCULAR REEDUCATION: CPT

## 2022-11-17 PROCEDURE — 97110 THERAPEUTIC EXERCISES: CPT

## 2022-11-17 PROCEDURE — 97530 THERAPEUTIC ACTIVITIES: CPT

## 2022-11-17 NOTE — PROGRESS NOTES
PT DAILY TREATMENT NOTE - [facility]     Patient Name: Chrissie Michelle  Date:2022  : 1950  [x]  Patient  Verified  Payor: Rafi Ray / Plan: [place] MED[place] PART [place] & B / Product Type: Medicare /    In time:0800  Out [id number]  Total Treatment Time (min): 54  Total Timed Codes (min): 54   1:1 Treatment Time ( [address] only): 54   Visit #: 3 of 16    Treatment Area: Other low back pain [M54.59]    SUBJECTIVE  Pain Level (0-10 scale): 1  Any medication changes, allergies to medications, adverse drug reactions, diagnosis change, or new procedure performed?: [x] No    [] Yes (see summary sheet for update)  Subjective functional status/changes:   [] No changes reported    Patient reports that he was stiff and sore after last visit. OBJECTIVE    25 min Therapeutic Exercise:  [x] See flow sheet :   Rationale: increase ROM, increase strength and decrease pain to improve the patients ability to complete ADLs    15 min Therapeutic Activity:  [x]  See flow sheet :   Rationale: increase ROM, increase strength and improve coordination  to improve the patients ability to complete ADLs     14 min Neuromuscular Re-education:  [x]  See flow sheet :   Rationale: improve coordination, improve balance and increase proprioception  to improve the patients ability to complete ADLs          With   [x] TE   [] TA   [] neuro   [] other: Patient Education: [x] Review HEP    [] Progressed/Changed HEP based on:   [] positioning   [] body mechanics   [] transfers   [] heat/ice application    [] other:      Other Objective/Functional Measures: NA     Pain Level (0-10 scale) post treatment: 0    ASSESSMENT/Changes in Function: Patient responds well to treatment session. Provided cues to maintain PPT during dead bug exercise. Added hamstring isometric to donkey kick. Patient was challenged with exercise prescribed. Will advance HEP next visit.  No adverse effects were noted from today's treatment session      Patient will continue to benefit from skilled PT services to modify and progress therapeutic interventions, address functional mobility deficits, address ROM deficits, address strength deficits, analyze and address soft tissue restrictions, analyze and cue movement patterns, analyze and modify body mechanics/ergonomics, assess and modify postural abnormalities, address imbalance and instruct in home and community integration to attain remaining goals. []  See Plan of Care  []  See progress note/recertification  []  See Discharge Summary         Progress towards goals / Updated goals:  Short Term Goals: To be accomplished in 4 weeks:                 Patient will report compliance with HEP 1x/day to aid in rehabilitation program.                 Status at IE: Provided initial HEP                 Current: In-progress, reviewed HEP to proper good carryover at home, 11/15/2022     Long Term Goals: To be accomplished in 8 weeks:                 Patient will increase bilateral LE strength to 5/5 MMT throughout to aid in completion of ADLs. Status at IE:4-/5                 Current:Same as IE                    Patient will report pain no greater than 0-2/10 throughout entire day to aid in completion of ADLs. Status at IE:1-10/10                 Current:Same as IE                    Patient will perform 10 pain free kettle bell squats with 15lbs with good form/technique to aid in completion of ADLs. Status at [address] with sit to stand transfer                 Current:Same as IE                    Patient will improve FOTO score to 74 points to demonstrate improvement in functional status.                  Status at IE:FOTO score = 67 (an established functional score where 100 = no disability)                 Current: Same as IE    PLAN  []  Upgrade activities as tolerated     [x]  Continue plan of care  []  Update interventions per flow sheet       []  Discharge due to:_  []  Other:_      Carlos Sandoval Cameron Archer, REBECCA, DPT 11/17/2022  8:20 AM    Future Appointments   Date Time [facility]   11/22/2022  9:30 AM [facility]   11/29/2022  9:30 AM REBECCA Rosenthal [facility]   12/1/2022  1:15 PM REBECCA Rosenthal [facility]   12/5/2022  8:45 AM REBECCA Rosenthal [facility]

## 2022-11-22 ENCOUNTER — HOSPITAL ENCOUNTER (OUTPATIENT)
Dept: PHYSICAL THERAPY | Age: 72
Discharge: HOME | End: 2022-11-22
Payer: MEDICARE

## 2022-11-22 PROCEDURE — 97530 THERAPEUTIC ACTIVITIES: CPT

## 2022-11-22 PROCEDURE — 97110 THERAPEUTIC EXERCISES: CPT

## 2022-11-22 PROCEDURE — 97112 NEUROMUSCULAR REEDUCATION: CPT

## 2022-11-22 NOTE — PROGRESS NOTES
PT DAILY TREATMENT NOTE - [facility]     Patient Name: William Singh  Date:2022  : 1950  [x]  Patient  Verified  Payor: Charlene Roe / Plan: [place] MEDICARE PART A & B / Product Type: Medicare /    In time:930  Out time:  Total Treatment Time (min): 45  Total Timed Codes (min): 45   1:1 Treatment Time ( [address] only): 45   Visit #: 4 of 16    Treatment Area: Other low back pain [M54.59]    SUBJECTIVE  Pain Level (0-10 scale): 0  Any medication changes, allergies to medications, adverse drug reactions, diagnosis change, or new procedure performed?: [x] No    [] Yes (see summary sheet for update)  Subjective functional status/changes:   [] No changes reported    Patient reports that he is still trying to figure out what is too much and what is too little. OBJECTIVE    22 min Therapeutic Exercise:  [x] See flow sheet :   Rationale: increase ROM, increase strength and decrease pain to improve the patients ability to complete ADLs    13 min Therapeutic Activity:  [x]  See flow sheet :   Rationale: increase ROM, increase strength and improve coordination  to improve the patients ability to complete ADLs     10 min Neuromuscular Re-education:  [x]  See flow sheet :   Rationale: improve coordination, improve balance and increase proprioception  to improve the patients ability to complete ADLs          With   [x] TE   [] TA   [] neuro   [] other: Patient Education: [x] Review HEP    [] Progressed/Changed HEP based on:   [] positioning   [] body mechanics   [] transfers   [] heat/ice application    [] other:      Other Objective/Functional Measures: NA     Pain Level (0-10 scale) post treatment: 0    ASSESSMENT/Changes in Function:   Patient responds well to treatment session. Patient demonstrated improved activity tolerance. Increased exercise intensity as a result. Patient was challenged with anti rotation exercise. Will advance HEP next visit.  No adverse effects were noted from today's treatment session. Patient will continue to benefit from skilled PT services to modify and progress therapeutic interventions, address functional mobility deficits, address ROM deficits, address strength deficits, analyze and address soft tissue restrictions, analyze and cue movement patterns, analyze and modify body mechanics/ergonomics, assess and modify postural abnormalities, address imbalance and instruct in home and community integration to attain remaining goals. []  See Plan of Care  []  See progress note/recertification  []  See Discharge Summary         Progress towards goals / Updated goals:  4 weeks:                 Patient will report compliance with HEP 1x/day to aid in rehabilitation program.                 Status at IE: Provided initial HEP                 Current: In-progress, reviewed HEP to proper good carryover at home, 11/15/2022     Long Term Goals: To be accomplished in 8 weeks:                 Patient will increase bilateral LE strength to 5/5 MMT throughout to aid in completion of ADLs. Status at IE:4-/5                 Current: In-progress, 4/5, 11/22/2022                    Patient will report pain no greater than 0-2/10 throughout entire day to aid in completion of ADLs. Status at IE:1-10/10                 Current:Same as IE                    Patient will perform 10 pain free kettle bell squats with 15lbs with good form/technique to aid in completion of ADLs. Status at [address] with sit to stand transfer                 Current:Same as IE                    Patient will improve FOTO score to 74 points to demonstrate improvement in functional status.                  Status at IE:FOTO score = 67 (an established functional score where 100 = no disability)                 Current: Same as IE    PLAN  []  Upgrade activities as tolerated     [x]  Continue plan of care  []  Update interventions per flow sheet       []  Discharge due to:_  [] Other:_      Clau Man, REBECCA, DPT 11/22/2022  9:34 AM    Future Appointments   Date Time Provider [facility]   11/29/2022  9:30 AM REBECCA Georges [facility]   12/1/2022  1:15 PM REBECCA Georges [facility]   12/5/2022  8:45 AM REBECCA Hall [facility]   12/21/2022 10:15 AM REBECCA Georges [facility]

## 2022-11-29 ENCOUNTER — HOSPITAL ENCOUNTER (OUTPATIENT)
Dept: PHYSICAL THERAPY | Age: 72
Discharge: HOME | End: 2022-11-29
Payer: MEDICARE

## 2022-11-29 PROCEDURE — 97112 NEUROMUSCULAR REEDUCATION: CPT

## 2022-11-29 PROCEDURE — 97530 THERAPEUTIC ACTIVITIES: CPT

## 2022-11-29 PROCEDURE — 97110 THERAPEUTIC EXERCISES: CPT

## 2022-11-29 NOTE — PROGRESS NOTES
PT DAILY TREATMENT NOTE - [facility]     Patient Name: Buck Galeano  Date:2022  : 1950  [x]  Patient  Verified  Payor: Sarah Schilling / Plan: [place] MEDICARE PART A & B / Product Type: Medicare /    In time:930  Out time:125  Total Treatment Time (min): 55  Total Timed Codes (min): 55   1:1 Treatment Time ( [address] only): 53   Visit #: 5 of 16    Treatment Area: Other low back pain [M54.59]    SUBJECTIVE  Pain Level (0-10 scale): 0  Any medication changes, allergies to medications, adverse drug reactions, diagnosis change, or new procedure performed?: [x] No    [] Yes (see summary sheet for update)  Subjective functional status/changes:   [] No changes reported  Patient reports that he has lumbar stiffness. OBJECTIVE    30 min Therapeutic Exercise:  [x] See flow sheet :   Rationale: increase ROM, increase strength and decrease pain to improve the patients ability to complete ADLs    15 min Therapeutic Activity:  [x]  See flow sheet :   Rationale: increase ROM, increase strength and improve coordination  to improve the patients ability to complete ADLs     8 min Neuromuscular Re-education:  [x]  See flow sheet :   Rationale: improve coordination, improve balance and increase proprioception  to improve the patients ability to complete ADLs          With   [x] TE   [] TA   [] neuro   [] other: Patient Education: [x] Review HEP    [] Progressed/Changed HEP based on:   [] positioning   [] body mechanics   [] transfers   [] heat/ice application    [] other:      Other Objective/Functional Measures: NA     Pain Level (0-10 scale) post treatment: 0    ASSESSMENT/Changes in Function: Patient responds well to treatment session. Patient required cues to promote spine safety with single arm row. He was challenged with lateral hip strengthening. Introduced leg press and he reported a reduction in symptoms.  . No adverse effects were noted from today's treatment session      Patient will continue to benefit from skilled PT services to modify and progress therapeutic interventions, address functional mobility deficits, address ROM deficits, address strength deficits, analyze and address soft tissue restrictions, analyze and cue movement patterns, analyze and modify body mechanics/ergonomics, assess and modify postural abnormalities, address imbalance and instruct in home and community integration to attain remaining goals. []  See Plan of Care  []  See progress note/recertification  []  See Discharge Summary         Progress towards goals / Updated goals:  4 weeks:                 Patient will report compliance with HEP 1x/day to aid in rehabilitation program.                 Status at IE: Provided initial HEP                 Current: In-progress, reviewed HEP to proper good carryover at home, 11/15/2022     Long Term Goals: To be accomplished in 8 weeks:                 Patient will increase bilateral LE strength to 5/5 MMT throughout to aid in completion of ADLs. Status at IE:4-/5                 Current: In-progress, 4/5, 11/22/2022                    Patient will report pain no greater than 0-2/10 throughout entire day to aid in completion of ADLs. Status at IE:1-10/10                 Current:Same as IE                    Patient will perform 10 pain free kettle bell squats with 15lbs with good form/technique to aid in completion of ADLs. Status at [address] with sit to stand transfer                 Current: In-progress, 2 x 10 with 7# and 0/10 pain, 11/29/2022                    Patient will improve FOTO score to 74 points to demonstrate improvement in functional status.                  Status at IE:FOTO score = 67 (an established functional score where 100 = no disability)                 Current: Same as IE    PLAN  []  Upgrade activities as tolerated     []  Continue plan of care  []  Update interventions per flow sheet       []  Discharge due to:_  []  Other:_ Shyann Will, PT, DPT 11/29/2022  10:03 AM    Future Appointments   Date Time Provider Macho Jaquez   12/1/2022  1:15 PM Brain Collet, PT [facility]   12/5/2022  8:45 AM Bushra Calix, PT [facility]   12/21/2022 10:15 AM Brain Collet, PT [facility]

## 2022-12-01 ENCOUNTER — HOSPITAL ENCOUNTER (OUTPATIENT)
Dept: PHYSICAL THERAPY | Age: 72
Discharge: HOME | End: 2022-12-01
Payer: MEDICARE

## 2022-12-01 PROCEDURE — 97112 NEUROMUSCULAR REEDUCATION: CPT

## 2022-12-01 PROCEDURE — 97530 THERAPEUTIC ACTIVITIES: CPT

## 2022-12-01 PROCEDURE — 97110 THERAPEUTIC EXERCISES: CPT

## 2022-12-05 ENCOUNTER — HOSPITAL ENCOUNTER (OUTPATIENT)
Dept: NON INVASIVE DIAGNOSTICS | Age: 72
Discharge: HOME | End: 2022-12-05
Payer: MEDICARE

## 2022-12-05 ENCOUNTER — TRANSCRIBE ORDER (OUTPATIENT)
Dept: REGISTRATION | Age: 72
End: 2022-12-05

## 2022-12-05 ENCOUNTER — HOSPITAL ENCOUNTER (OUTPATIENT)
Dept: LAB | Age: 72
Discharge: HOME | End: 2022-12-05
Payer: MEDICARE

## 2022-12-05 ENCOUNTER — TELEPHONE (OUTPATIENT)
Dept: PHYSICAL THERAPY | Age: 72
End: 2022-12-05

## 2022-12-05 DIAGNOSIS — M65.342 TRIGGER RING FINGER OF LEFT HAND: ICD-10-CM

## 2022-12-05 DIAGNOSIS — M79.645 PAIN IN FINGER OF LEFT HAND: ICD-10-CM

## 2022-12-05 DIAGNOSIS — Z01.812 BLOOD TESTS PRIOR TO TREATMENT OR PROCEDURE: ICD-10-CM

## 2022-12-05 DIAGNOSIS — Z01.812 BLOOD TESTS PRIOR TO TREATMENT OR PROCEDURE: Primary | ICD-10-CM

## 2022-12-05 LAB
ALBUMIN SERPL-MCNC: 3.3 G/DL (ref 3.4–5)
ALBUMIN/GLOB SERPL: 1.1 {RATIO} (ref 0.8–1.7)
ALP SERPL-CCNC: 69 U/L (ref 45–117)
ALT SERPL-CCNC: 24 U/L (ref 16–61)
ANION GAP SERPL CALC-SCNC: 3 MMOL/L (ref 3–18)
AST SERPL-CCNC: 20 U/L (ref 10–38)
BASOPHILS # BLD: 0.1 K/UL (ref 0–0.1)
BASOPHILS NFR BLD: 1 % (ref 0–2)
BILIRUB SERPL-MCNC: 0.6 MG/DL (ref 0.2–1)
BUN SERPL-MCNC: 21 MG/DL (ref 7–18)
BUN/CREAT SERPL: 21 (ref 12–20)
CALCIUM SERPL-MCNC: 9.4 MG/DL (ref 8.5–10.1)
CALCULATED R AXIS, ECG10: -16 DEGREES
CALCULATED T AXIS, ECG11: 47 DEGREES
CHLORIDE SERPL-SCNC: 108 MMOL/L (ref 100–111)
CO2 SERPL-SCNC: 30 MMOL/L (ref 21–32)
CREAT SERPL-MCNC: 1 MG/DL (ref 0.6–1.3)
DIAGNOSIS, 93000: NORMAL
DIFFERENTIAL METHOD BLD: ABNORMAL
EOSINOPHIL # BLD: 0.2 K/UL (ref 0–0.4)
EOSINOPHIL NFR BLD: 3 % (ref 0–5)
ERYTHROCYTE [DISTWIDTH] IN BLOOD BY AUTOMATED COUNT: 12.8 % (ref 11.6–14.5)
GLOBULIN SER CALC-MCNC: 3.1 G/DL (ref 2–4)
GLUCOSE SERPL-MCNC: 88 MG/DL (ref 74–99)
HCT VFR BLD AUTO: 45 % (ref 36–48)
HGB BLD-MCNC: 14.7 G/DL (ref 13–16)
IMM GRANULOCYTES # BLD AUTO: 0 K/UL (ref 0–0.04)
IMM GRANULOCYTES NFR BLD AUTO: 0 % (ref 0–0.5)
LYMPHOCYTES # BLD: 2 K/UL (ref 0.9–3.6)
LYMPHOCYTES NFR BLD: 36 % (ref 21–52)
MCH RBC QN AUTO: 31.2 PG (ref 24–34)
MCHC RBC AUTO-ENTMCNC: 32.7 G/DL (ref 31–37)
MCV RBC AUTO: 95.5 FL (ref 78–100)
MONOCYTES # BLD: 1 K/UL (ref 0.05–1.2)
MONOCYTES NFR BLD: 17 % (ref 3–10)
NEUTS SEG # BLD: 2.4 K/UL (ref 1.8–8)
NEUTS SEG NFR BLD: 43 % (ref 40–73)
NRBC # BLD: 0 K/UL (ref 0–0.01)
NRBC BLD-RTO: 0 PER 100 WBC
PLATELET # BLD AUTO: 229 K/UL (ref 135–420)
PMV BLD AUTO: 9.3 FL (ref 9.2–11.8)
POTASSIUM SERPL-SCNC: 4.6 MMOL/L (ref 3.5–5.5)
PROT SERPL-MCNC: 6.4 G/DL (ref 6.4–8.2)
Q-T INTERVAL, ECG07: 370 MS
QRS DURATION, ECG06: 72 MS
QTC CALCULATION (BEZET), ECG08: 396 MS
RBC # BLD AUTO: 4.71 M/UL (ref 4.35–5.65)
SODIUM SERPL-SCNC: 141 MMOL/L (ref 136–145)
VENTRICULAR RATE, ECG03: 69 BPM
WBC # BLD AUTO: 5.7 K/UL (ref 4.6–13.2)

## 2022-12-05 PROCEDURE — 36415 COLL VENOUS BLD VENIPUNCTURE: CPT

## 2022-12-05 PROCEDURE — 93005 ELECTROCARDIOGRAM TRACING: CPT

## 2022-12-05 PROCEDURE — 80053 COMPREHEN METABOLIC PANEL: CPT

## 2022-12-05 PROCEDURE — 85025 COMPLETE CBC W/AUTO DIFF WBC: CPT

## 2022-12-21 ENCOUNTER — HOSPITAL ENCOUNTER (OUTPATIENT)
Dept: PHYSICAL THERAPY | Age: 72
Discharge: HOME | End: 2022-12-21
Payer: MEDICARE

## 2022-12-21 PROCEDURE — 97530 THERAPEUTIC ACTIVITIES: CPT

## 2022-12-21 PROCEDURE — 97110 THERAPEUTIC EXERCISES: CPT

## 2022-12-21 PROCEDURE — 97112 NEUROMUSCULAR REEDUCATION: CPT

## 2022-12-21 NOTE — PROGRESS NOTES
PT DAILY TREATMENT NOTE    Patient Name: Sofia Romero  Date:2022  : 1950  [x]  Patient  Verified  Payor: Carolyn Sea / Plan: [place] / Product Type: Medicare /    In time: 3111  Out time: 1057  Total Treatment Time (min): 42  Total Timed Codes (min): 42  1:1 Treatment Time ( [address] only): 42   Visit #: 7 of 16    Treatment Dx: Other low back pain [M54.59]    SUBJECTIVE  Pain Level (0-10 scale): 0  Any medication changes, allergies to medications, adverse drug reactions, diagnosis change, or new procedure performed?: [x] No    [] Yes (see summary sheet for update)  Subjective functional status/changes:   [] No changes reported  \"[place] but kept up with the exercises as much as I could. The outside of the thighs feels very tight, which is what bothers me the most right now. I want to learn more exercises I can do on my own to maximize my strength. \"    OBJECTIVE    15 min Therapeutic Exercise:  [x] See flow sheet :   Rationale: increase ROM, increase strength and decrease pain to improve the patients ability to complete ADLs  ambulation safety and efficiency in order to improve patient's ability to safely ambulate at home for self care.         15 min Therapeutic Activity:  [x]  See flow sheet :   Rationale: increase ROM, increase strength and improve coordination  to improve the patients ability to Complete ADLS     12 min Neuromuscular Re-education:  [x]  See flow sheet :   Rationale: improve coordination, improve balance and increase proprioception  to improve the patients ability to complete ADLS, and decrease falls risk    With   [] TE   [] TA   [] neuro   [] other: Patient Education: [x] Review HEP    [] Progressed/Changed HEP based on:   [] positioning   [] body mechanics   [] transfers   [] heat/ice application    [] other:      Other Objective/Functional Measures: NA     Pain Level (0-10 scale) post treatment: 0    ASSESSMENT/Changes in Function: Patient instructed in use of resistance band set for home use and how to obtain set for home use. Patient noting moderate decrease in pain intensity with PT intervention and gradual improvements in core/abdominal and functional bilateral LE strength. Patient responds well to treatment session. No adverse effects were noted from today's treatment session. Patient will continue to benefit from skilled PT services to modify and progress therapeutic interventions, address functional mobility deficits, address ROM deficits, address strength deficits, analyze and address soft tissue restrictions, analyze and cue movement patterns, analyze and modify body mechanics/ergonomics, assess and modify postural abnormalities,  and instruct in home and community integration to attain remaining goals. []  See Plan of Care  [x]  See progress note/recertification  []  See Discharge Summary         Progress towards goals / Updated goals:  Short Term Goals: To be accomplished in 4 weeks:                 Patient will report compliance with HEP 1x/day to aid in rehabilitation program.                 Status at IE: Provided initial HEP                 Current: In-progress, introduced patient to home set of resistance bands and recommended use, 12/21/2022     Long Term Goals: To be accomplished in 8 weeks:                 Patient will increase bilateral LE strength to 5/5 MMT throughout to aid in completion of ADLs. Status at IE:4-/5                 Current: In-progress, 4/5, 11/22/2022                    Patient will report pain no greater than 0-2/10 throughout entire day to aid in completion of ADLs. Status at IE:1-10/10                 Current: In-progress, 0-6/10,  12/21/2022                    Patient will perform 10 pain free kettle bell squats with 15lbs with good form/technique to aid in completion of ADLs.                  Status at [address] with sit to stand transfer                 Current: In-progress, 2 x 10 with 7# and 0/10 pain, 11/29/2022                    Patient will improve FOTO score to 74 points to demonstrate improvement in functional status. Status at IE:FOTO score = 67 (an established functional score where 100 = no disability)                 Current: In-progress, remains 79,  12/21/2022    PLAN  []  Upgrade activities as tolerated     [x]  Continue plan of care  []  Update interventions per flow sheet       []  Discharge due to:_  []  Other:_      Ronald Youssef, PT 12/21/2022  10:23 AM    No future appointments.

## 2022-12-21 NOTE — PROGRESS NOTES
[facility] at [address] [place] [address] Drive: [phone]   Fax: [phone]  Progress Note  Patient name: Keith Davidson Start of Care: 2022   Referral source: Mati Eden MD : 1950                  Medical Diagnosis: Other low back pain [M54.59]    Onset Date:May 2022                  Treatment Diagnosis: Low back pain   Prior Hospitalization: see medical history Provider#: [id number]   Medications: Verified on Patient summary List      ===========================================================================================  Assessment / Summary of Care:  Keith Davidson is a 71 y.o.  male who is now in process of being instructed in use of resistance band set for home use and how to obtain set for home use. Patient noting moderate decrease in pain intensity with PT intervention and gradual improvements in core/abdominal and functional bilateral LE strength. Patient will continue to benefit from skilled PT services to modify and progress therapeutic interventions, address functional mobility deficits, address ROM deficits, address strength deficits, analyze and address soft tissue restrictions, analyze and cue movement patterns, analyze and modify body mechanics/ergonomics, assess and modify postural abnormalities,  and instruct in home and community integration to attain remaining goals.    ===========================================================================================    Plan:Continue therapy per initial plan/protocol at a frequency of  1 x per week for 4 weeks    Progress Towards Goals:   hort Term Goals: To be accomplished in 4 weeks:                 Patient will report compliance with HEP 1x/day to aid in rehabilitation program.                 Status at IE: Provided initial HEP                 Current: In-progress, introduced patient to home set of resistance bands and recommended use, 2022     Long Term Goals:  To be accomplished in 8 weeks:                 Patient will increase bilateral LE strength to 5/5 MMT throughout to aid in completion of ADLs. Status at IE:4-/5                 Current: In-progress, 4/5, 11/22/2022                    Patient will report pain no greater than 0-2/10 throughout entire day to aid in completion of ADLs. Status at IE:1-10/10                 Current: In-progress, 0-6/10,  12/21/2022                    Patient will perform 10 pain free kettle bell squats with 15lbs with good form/technique to aid in completion of ADLs. Status at [address] with sit to stand transfer                 Current: In-progress, 2 x 10 with 7# and 0/10 pain, 11/29/2022                    Patient will improve FOTO score to 74 points to demonstrate improvement in functional status. Status at IE:FOTO score = 67 (an established functional score where 100 = no disability)                 Current: In-progress, remains 67,  12/21/2022    ===========================================================================================  Subjective: \"[place] past [place] but kept up with the exercises as much as I could. The outside of the thighs feels very tight, which is what bothers me the most right now. I want to learn more exercises I can do on my own to maximize my strength. \"        Therapist Signature: Eros Stock, PT, DPT Date: 01/98/5562   Re-Certification: NA Time: 11:20 AM         [facility] at [facility], [address] [place] [address]                    Phone: [phone]   Fax: [phone]  .

## 2023-01-03 ENCOUNTER — APPOINTMENT (OUTPATIENT)
Dept: PHYSICAL THERAPY | Age: 73
End: 2023-01-03
Payer: MEDICARE

## 2023-01-10 ENCOUNTER — HOSPITAL ENCOUNTER (OUTPATIENT)
Dept: PHYSICAL THERAPY | Age: 73
Discharge: HOME | End: 2023-01-10
Payer: MEDICARE

## 2023-01-10 PROCEDURE — 97110 THERAPEUTIC EXERCISES: CPT

## 2023-01-10 PROCEDURE — 97112 NEUROMUSCULAR REEDUCATION: CPT

## 2023-01-10 NOTE — PROGRESS NOTES
PT DAILY TREATMENT NOTE - [facility]     Patient Name: Keith Davidson  Date:1/10/2023  : 1950  [x]  Patient  Verified  Payor: Nael Aguilar / Plan: [place] MEDICARE PART A & B / Product Type: Medicare /    In time:1100  Out time:1130  Total Treatment Time (min): 30  Total Timed Codes (min): 30   1:1 Treatment Time ( [address] only): 30   Visit #: 8 of 16    Treatment Area: Other low back pain [M54.59]    SUBJECTIVE  Pain Level (0-10 scale): 0  Any medication changes, allergies to medications, adverse drug reactions, diagnosis change, or new procedure performed?: [x] No    [] Yes (see summary sheet for update)  Subjective functional status/changes:   [] No changes reported    Patient reports that he has pain when getting off of the floor and when getting out of bed. OBJECTIVE    20 min Therapeutic Exercise:  [x] See flow sheet :   Rationale: increase ROM, increase strength and decrease pain to improve the patients ability to complete ADLs    10 min Neuromuscular Re-education:  [x]  See flow sheet :   Rationale: improve coordination, improve balance and increase proprioception  to improve the patients ability to complete ADLs        With   [x] TE   [] TA   [] neuro   [] other: Patient Education: [x] Review HEP    [] Progressed/Changed HEP based on:   [] positioning   [] body mechanics   [] transfers   [] heat/ice application    [] other:      Other Objective/Functional Measures: NA     Pain Level (0-10 scale) post treatment: 0    ASSESSMENT/Changes in Function:   Patient responds well to treatment session. Reviewed proper log roll and how to get on and off of the floor with patient. He performed the technique and reported decrease in symptoms. Will follow up next week and perform reassessment for potential discharge.  No adverse effects were noted from today's treatment session    Patient will continue to benefit from skilled PT services to modify and progress therapeutic interventions, address functional mobility deficits, address ROM deficits, address strength deficits, analyze and address soft tissue restrictions, analyze and cue movement patterns, analyze and modify body mechanics/ergonomics, assess and modify postural abnormalities, address imbalance and instruct in home and community integration to attain remaining goals. []  See Plan of Care  []  See progress note/recertification  []  See Discharge Summary         Progress towards goals / Updated goals:  Short Term Goals: To be accomplished in 4 weeks:                 Patient will report compliance with HEP 1x/day to aid in rehabilitation program.                 Status at IE: Provided initial HEP                 Current: Met, 1/10/2023    Long Term Goals: To be accomplished in 8 weeks:                 Patient will increase bilateral LE strength to 5/5 MMT throughout to aid in completion of ADLs. Status at IE:4-/5                 Current: In-progress, 4/5, 11/22/2022                    Patient will report pain no greater than 0-2/10 throughout entire day to aid in completion of ADLs. Status at IE:1-10/10                 Current: In-progress, 0-6/10,  12/21/2022                    Patient will perform 10 pain free kettle bell squats with 15lbs with good form/technique to aid in completion of ADLs. Status at [address] with sit to stand transfer                 Current: In-progress, 2 x 10 with 7# and 0/10 pain, 11/29/2022                    Patient will improve FOTO score to 74 points to demonstrate improvement in functional status. Status at IE:FOTO score = 67 (an established functional score where 100 = no disability)                 Current:  In-progress, remains 79,  12/21/2022    PLAN  []  Upgrade activities as tolerated     [x]  Continue plan of care  []  Update interventions per flow sheet       []  Discharge due to:_  []  Other:_      Bridger Chávez, PT, DPT 1/10/2023  11:15 AM    Future Appointments   Date Time Provider Macho Jaquez   1/17/2023 10:30 AM Elaine Falcon, PT [facility]   1/24/2023  4:00 PM REBECCA Méndez [facility]

## 2023-01-17 ENCOUNTER — HOSPITAL ENCOUNTER (OUTPATIENT)
Dept: PHYSICAL THERAPY | Age: 73
Discharge: HOME | End: 2023-01-17
Payer: MEDICARE

## 2023-01-17 PROCEDURE — 97112 NEUROMUSCULAR REEDUCATION: CPT

## 2023-01-17 PROCEDURE — 97110 THERAPEUTIC EXERCISES: CPT

## 2023-01-17 NOTE — PROGRESS NOTES
PT DAILY TREATMENT NOTE - [facility]     Patient Name: Alis Cristina  Date:2023  : 1950  [x]  Patient  Verified  Payor: Calderon Phipps / Plan: [place] MEDICARE PART A & B / Product Type: Medicare /    In time:1030  Out time:1100  Total Treatment Time (min): 30  Total Timed Codes (min): 30   1:1 Treatment Time ( [address] only): 30   Visit #: 9 of 16    Treatment Area: Other low back pain [M54.59]    SUBJECTIVE  Pain Level (0-10 scale): 0  Any medication changes, allergies to medications, adverse drug reactions, diagnosis change, or new procedure performed?: [x] No    [] Yes (see summary sheet for update)  Subjective functional status/changes:   [] No changes reported    Patient reports that he is doing well and has minimal back pain. He states getting on and off of the floor during yoga has been less painful. OBJECTIVE    20 min Therapeutic Exercise:  [x] See flow sheet :   Rationale: increase ROM, increase strength and decrease pain to improve the patients ability to complete ADLs    10 min Neuromuscular Re-education:  [x]  See flow sheet :   Rationale: improve coordination, improve balance and increase proprioception  to improve the patients ability to complete ADLs       With   [x] TE   [] TA   [] neuro   [] other: Patient Education: [x] Review HEP    [] Progressed/Changed HEP based on:   [] positioning   [] body mechanics   [] transfers   [] heat/ice application    [] other     Other Objective/Functional Measures:   FOTO 83  MMT 5/5  STS 15# no pain     Pain Level (0-10 scale) post treatment: 0    ASSESSMENT/Changes in Function:     Patient responds well to treatment session. No adverse effects were noted from today's treatment session. Patient is being discharged as he has met 100% of his short and long term goals. He has developed strength and functional mobility resulting in increased activity tolerance.  He has developed spine safety awareness/techniques improving tolerance to transfers from bed and floor. He is compliant with HEP and plans to reduce future episodes of care with independent exercise. Provided HEP to promote seamless transition to independent management of his condition. []  See Plan of Care  []  See progress note/recertification  [x]  See Discharge Summary         Progress towards goals / Updated goals:  Short Term Goals: To be accomplished in 4 weeks:                 Patient will report compliance with HEP 1x/day to aid in rehabilitation program.                 Status at IE: Provided initial HEP                 Current: Met, 1/10/2023     Long Term Goals: To be accomplished in 8 weeks:                 Patient will increase bilateral LE strength to 5/5 MMT throughout to aid in completion of ADLs. Status at IE:4-/5                 Current: Met, 5/5, 1/17/2023                    Patient will report pain no greater than 0-2/10 throughout entire day to aid in completion of ADLs. Status at IE:1-10/10                 Current: Met, 0-2/10,  1/17/2023                    Patient will perform 10 pain free kettle bell squats with 15lbs with good form/technique to aid in completion of ADLs. Status at [address] with sit to stand transfer                 Current: Met, 15#, 1/17/2023                    Patient will improve FOTO score to 74 points to demonstrate improvement in functional status.                  Status at IE:FOTO score = 67 (an established functional score where 100 = no disability)                 Current: Met 83, 1/17/2023    PLAN  []  Upgrade activities as tolerated     [x]  Continue plan of care  []  Update interventions per flow sheet       []  Discharge due to:_  []  Other:_      Loi Alvarado PT, DPT 1/17/2023  10:28 AM    Future Appointments   Date Time Provider Macho Jaquez   1/17/2023 10:30 AM REBECCA Olson [facility]   1/24/2023  4:00 PM Adela Lynn [facility]

## 2023-01-17 NOTE — PROGRESS NOTES
Physical Therapy Discharge Instructions    [facility] at [facility], [address] [place] [address]  Phone: [phone]   Fax: [phone]      Patient: Pam Oakes  : 1950    Continue Home Exercise Program 3-4 times per week            Follow up with MD:     [] Upon completion of therapy     [x] As needed      Recommendations:     [x]   Return to activity with home program    []   Return to activity with the following modifications:       []Post Rehab Program    []Join Independent aquatic program     [x]Return to/join local gym      Additional Comments: Please contact clinic at above phone number if you have any questions regarding Home Exercise Program or self care instructions.       Jakub Butts PT, DPT 2023 11:42 AM

## 2023-01-17 NOTE — PROGRESS NOTES
[facility] at [address] [place] [address] Drive: [phone]   Fax: [phone]  Discharge Summary  Patient Name: Pablo Bhatia : 1950   Medical   Diagnosis: Other low back pain [M54.59] Treatment Diagnosis: Low back pain   Onset Date: May 2022     Referral Source: Lyle Stone MD Start of Care [facility]): 2022   Prior Hospitalization: See medical history Provider #: [id number]   Prior Level of Function: Yoga, regular exercise program, active life style   Comorbidities: Migraines, AFIB   Medications: Verified on Patient Summary List      ===========================================================================================  Assessment / Summary of Care:  Pablo Bhatia is a 71 y.o. male with low back pain and bilateral LE radiculopathy. Patient is being discharged as he has met 100% of his short and long term goals. He has developed strength and functional mobility resulting in increased activity tolerance. He has developed spine safety awareness/techniques improving tolerance to transfers from bed and floor. He is compliant with HEP and plans to reduce future episodes of care with independent exercise. Provided HEP to promote seamless transition to independent management of his condition.     ===========================================================================================    Plan: Discharge to independent HEP. Goals:   Short Term Goals: To be accomplished in 4 weeks:                 Patient will report compliance with HEP 1x/day to aid in rehabilitation program.                 Status at IE: Provided initial HEP                 Current: Met, 1/10/2023     Long Term Goals: To be accomplished in 8 weeks:                 Patient will increase bilateral LE strength to 5/5 MMT throughout to aid in completion of ADLs.                  Status at IE:4-/                 Current: Met, , 2023                    Patient will report pain no greater than 0-2/10 throughout entire day to aid in completion of ADLs. Status at IE:1-10/10                 Current: Met, 0-2/10,  1/17/2023                    Patient will perform 10 pain free kettle bell squats with 15lbs with good form/technique to aid in completion of ADLs. Status at [address] with sit to stand transfer                 Current: Met, 15#, 1/17/2023                    Patient will improve FOTO score to 74 points to demonstrate improvement in functional status. Status at IE:FOTO score = 67 (an established functional score where 100 = no disability)                 Current: Met 83, 1/17/2023       ===========================================================================================  Subjective: Patient reports that he is doing well and has minimal back pain. He states getting on and off of the floor during yoga has been less painful.     Objective:   FOTO 83  MMT 5/5  STS 15# no pain     Therapist Signature: Louis Montaño PT, DPT Date: 1/17/2023     Time: 11:42 AM

## 2023-01-24 ENCOUNTER — APPOINTMENT (OUTPATIENT)
Dept: PHYSICAL THERAPY | Age: 73
End: 2023-01-24
Payer: MEDICARE

## 2023-05-19 ENCOUNTER — HOSPITAL ENCOUNTER (OUTPATIENT)
Facility: HOSPITAL | Age: 73
End: 2023-05-19
Payer: MEDICARE

## 2023-05-19 DIAGNOSIS — Z12.5 SPECIAL SCREENING FOR MALIGNANT NEOPLASM OF PROSTATE: ICD-10-CM

## 2023-05-19 DIAGNOSIS — E78.2 MIXED HYPERLIPIDEMIA: ICD-10-CM

## 2023-05-19 LAB
ALBUMIN SERPL-MCNC: 3.7 G/DL (ref 3.4–5)
ALBUMIN/GLOB SERPL: 1.2 (ref 0.8–1.7)
ALP SERPL-CCNC: 69 U/L (ref 45–117)
ALT SERPL-CCNC: 27 U/L (ref 16–61)
ANION GAP SERPL CALC-SCNC: 0 MMOL/L (ref 3–18)
AST SERPL-CCNC: 28 U/L (ref 10–38)
BILIRUB SERPL-MCNC: 0.8 MG/DL (ref 0.2–1)
BUN SERPL-MCNC: 20 MG/DL (ref 7–18)
BUN/CREAT SERPL: 20 (ref 12–20)
CALCIUM SERPL-MCNC: 9.5 MG/DL (ref 8.5–10.1)
CHLORIDE SERPL-SCNC: 108 MMOL/L (ref 100–111)
CHOLEST SERPL-MCNC: 245 MG/DL
CO2 SERPL-SCNC: 31 MMOL/L (ref 21–32)
CREAT SERPL-MCNC: 0.98 MG/DL (ref 0.6–1.3)
GLOBULIN SER CALC-MCNC: 3.1 G/DL (ref 2–4)
GLUCOSE SERPL-MCNC: 95 MG/DL (ref 74–99)
HDLC SERPL-MCNC: 55 MG/DL (ref 40–60)
HDLC SERPL: 4.5 (ref 0–5)
LDLC SERPL CALC-MCNC: 158.4 MG/DL (ref 0–100)
LIPID PANEL: ABNORMAL
POTASSIUM SERPL-SCNC: 5.2 MMOL/L (ref 3.5–5.5)
PROT SERPL-MCNC: 6.8 G/DL (ref 6.4–8.2)
PSA SERPL-MCNC: 1.4 NG/ML (ref 0–4)
SODIUM SERPL-SCNC: 139 MMOL/L (ref 136–145)
TRIGL SERPL-MCNC: 158 MG/DL
VLDLC SERPL CALC-MCNC: 31.6 MG/DL

## 2023-05-19 PROCEDURE — 80061 LIPID PANEL: CPT

## 2023-05-19 PROCEDURE — 84153 ASSAY OF PSA TOTAL: CPT

## 2023-05-19 PROCEDURE — 80053 COMPREHEN METABOLIC PANEL: CPT

## 2023-05-19 PROCEDURE — 36415 COLL VENOUS BLD VENIPUNCTURE: CPT

## 2023-07-26 ENCOUNTER — HOSPITAL ENCOUNTER (OUTPATIENT)
Facility: HOSPITAL | Age: 73
Discharge: HOME | End: 2023-07-29
Payer: MEDICARE

## 2023-07-26 LAB
ALBUMIN SERPL-MCNC: 3.4 G/DL (ref 3.4–5)
ALBUMIN/GLOB SERPL: 1.2 (ref 0.8–1.7)
ALP SERPL-CCNC: 66 U/L (ref 45–117)
ALT SERPL-CCNC: 29 U/L (ref 16–61)
ANION GAP SERPL CALC-SCNC: 5 MMOL/L (ref 3–18)
AST SERPL-CCNC: 25 U/L (ref 10–38)
BASOPHILS # BLD: 0.1 K/UL (ref 0–0.1)
BASOPHILS NFR BLD: 1 % (ref 0–2)
BILIRUB SERPL-MCNC: 0.8 MG/DL (ref 0.2–1)
BUN SERPL-MCNC: 17 MG/DL (ref 7–18)
BUN/CREAT SERPL: 16 (ref 12–20)
CALCIUM SERPL-MCNC: 9.1 MG/DL (ref 8.5–10.1)
CHLORIDE SERPL-SCNC: 110 MMOL/L (ref 100–111)
CO2 SERPL-SCNC: 26 MMOL/L (ref 21–32)
CREAT SERPL-MCNC: 1.05 MG/DL (ref 0.6–1.3)
DIFFERENTIAL METHOD BLD: ABNORMAL
EOSINOPHIL # BLD: 0.1 K/UL (ref 0–0.4)
EOSINOPHIL NFR BLD: 2 % (ref 0–5)
ERYTHROCYTE [DISTWIDTH] IN BLOOD BY AUTOMATED COUNT: 12.3 % (ref 11.6–14.5)
FAX TO NUMBER: NORMAL
GLOBULIN SER CALC-MCNC: 2.8 G/DL (ref 2–4)
GLUCOSE SERPL-MCNC: 99 MG/DL (ref 74–99)
HCT VFR BLD AUTO: 44.4 % (ref 36–48)
HGB BLD-MCNC: 14.7 G/DL (ref 13–16)
IMM GRANULOCYTES # BLD AUTO: 0 K/UL (ref 0–0.04)
IMM GRANULOCYTES NFR BLD AUTO: 0 % (ref 0–0.5)
LYMPHOCYTES # BLD: 2.1 K/UL (ref 0.9–3.6)
LYMPHOCYTES NFR BLD: 31 % (ref 21–52)
MCH RBC QN AUTO: 30.8 PG (ref 24–34)
MCHC RBC AUTO-ENTMCNC: 33.1 G/DL (ref 31–37)
MCV RBC AUTO: 93.1 FL (ref 78–100)
MONOCYTES # BLD: 1 K/UL (ref 0.05–1.2)
MONOCYTES NFR BLD: 15 % (ref 3–10)
NEUTS SEG # BLD: 3.5 K/UL (ref 1.8–8)
NEUTS SEG NFR BLD: 51 % (ref 40–73)
NRBC # BLD: 0 K/UL (ref 0–0.01)
NRBC BLD-RTO: 0 PER 100 WBC
PLATELET # BLD AUTO: 240 K/UL (ref 135–420)
PMV BLD AUTO: 9.2 FL (ref 9.2–11.8)
POTASSIUM SERPL-SCNC: 4.1 MMOL/L (ref 3.5–5.5)
PROT SERPL-MCNC: 6.2 G/DL (ref 6.4–8.2)
RBC # BLD AUTO: 4.77 M/UL (ref 4.35–5.65)
SODIUM SERPL-SCNC: 141 MMOL/L (ref 136–145)
TEST RESULTS FAXED TO: NORMAL
WBC # BLD AUTO: 6.8 K/UL (ref 4.6–13.2)

## 2023-07-26 PROCEDURE — 80053 COMPREHEN METABOLIC PANEL: CPT

## 2023-07-26 PROCEDURE — 85025 COMPLETE CBC W/AUTO DIFF WBC: CPT

## 2023-07-26 PROCEDURE — 36415 COLL VENOUS BLD VENIPUNCTURE: CPT

## 2024-02-09 ENCOUNTER — TELEPHONE (OUTPATIENT)
Facility: HOSPITAL | Age: 74
End: 2024-02-09

## 2024-02-12 ENCOUNTER — HOSPITAL ENCOUNTER (OUTPATIENT)
Facility: HOSPITAL | Age: 74
Setting detail: RECURRING SERIES
Discharge: HOME | End: 2024-02-15
Payer: MEDICARE

## 2024-02-12 PROCEDURE — 97161 PT EVAL LOW COMPLEX 20 MIN: CPT

## 2024-02-12 PROCEDURE — 97110 THERAPEUTIC EXERCISES: CPT

## 2024-02-12 PROCEDURE — 97535 SELF CARE MNGMENT TRAINING: CPT

## 2024-02-12 NOTE — PROGRESS NOTES
[facility] at [facility]  [address] [address]  Phone: [phone]   Fax: [phone]    Plan of Care/ Statement of Necessity for Physical Therapy Services        Patient name: Jagjit Greer Start of Care: 2024   Referral source: Kevin Alves MD : 1950    Medical Diagnosis: Pain in left thigh [M79.652]      Onset Date:~2002    Treatment Diagnosis:  M79.652  Pain in left thigh                                           Prior Hospitalization: see medical history Provider#: [id number]   Medications: Verified on Patient Summary List     Comorbidities: sciatica left side, LBP, left knee OA, Arrythmia   Prior Level of Function: functionally independent, no AD, active lifestyle, cycling, used to play racquet sports, yoga       The Plan of Care and following information is based on the information from the initial evaluation.  Assessment/ key information:  Pt is a 72 yo male who [facility] with c/o left proximal post thigh pain onset 20 years ago with intermittent exacerbations since that has most recently become more consistent that limits walking when pain flares up and will be exacerbated by sudden movements. Pt presents with sign and symptoms consistent with left proximal hamstring strain with associated hamstring MMT weakness, limited left hamstring flexibility, and intermittent pain with activity particularly sudden movements . Pt will benefit from skilled Pt to address their impairments and facilitate improved functional status.   Evaluation Complexity HistoryMEDIUM  Complexity : 1-2 comorbidities / personal factors will impact the outcome/ POC  ; Examination MEDIUM Complexity : 3 Standardized tests and measures addressin body structure, function, activity limitation and / or participation in recreation  ;Presentation LOW Complexity : Stable, uncomplicated  ;Clinical Decision Making MEDIUM Complexity : FOTO score of 26-74 FOTO score = an

## 2024-02-12 NOTE — PROGRESS NOTES
PHYSICAL / OCCUPATIONAL THERAPY - Lower Extremity Eval and Daily note (updated )    Patient Name: Jagjit Greer    Date: 2024    : 1950  Insurance: Payor: MEDICARE / Plan: MEDICARE PART A AND B / Product Type: *No Product type* /      Patient  verified yes     Visit #   Current / Total 1 16   Time   In / Out 9:28 am 10:10 am   Pain   In / Out 0 0   Subjective Functional Status/Changes: Pt is a 73 year old male presenting with c/o left post thigh pain with onset 20 years ago backpedaling playing racket sports. States intermittent provocation of pain over the years. Recently notes increased frequency of pain and irritation over the last 3-4 months intermittently limiting ability to walk. States he had x-rays that were unremarkable. States he was diagnosed with a partial tear. States most recently he stepped back a month ago and had increased pain immediately that limited his ability to sit. Pain localized to superior thigh just below gluteals per pt gesturing. Stubbing toe reproduces pain as well as stepping off a curb.   Changes to:  Meds, Allergies, Med Hx, Sx Hx?  If yes, update Summary List no       TREATMENT AREA =  Pain in left hip [M25.552]    SUBJECTIVE  PLOF: functionally independent, no AD, active lifestyle, cycling, used to play racquet sports, yoga  Limitations to PLOF: backpedaling pain limited, prolonged sitting in a chair for a period of time will provoke pain  Mechanism of Injury:   Current symptoms/Complaints: 0/10 at best with rest, minimal pain over the last week; 10/10 at worst in the last 4-6 weeks,pt reports they are able to sleep through the night secondary to pain  Previous Treatment/Compliance: none  Imaging/Diagnostic Testing: x-rays unremarkable of thigh/hip  PMHx/Surgical Hx: sciatica left side, LBP, left knee OA, Arrythmia  Work Hx: see intake  Living Situation:   Pt Goals: \"techniques to prevent myself from tweaking it and recover more quickly.\"  Barriers: []pain

## 2024-02-14 ENCOUNTER — HOSPITAL ENCOUNTER (OUTPATIENT)
Facility: HOSPITAL | Age: 74
Setting detail: RECURRING SERIES
Discharge: HOME | End: 2024-02-17
Payer: MEDICARE

## 2024-02-14 PROCEDURE — 97110 THERAPEUTIC EXERCISES: CPT

## 2024-02-14 PROCEDURE — 97530 THERAPEUTIC ACTIVITIES: CPT

## 2024-02-14 PROCEDURE — 97140 MANUAL THERAPY 1/> REGIONS: CPT

## 2024-02-14 PROCEDURE — 97112 NEUROMUSCULAR REEDUCATION: CPT

## 2024-02-14 NOTE — PROGRESS NOTES
compliance with initial HEP and provided written copy of additions to HEP.   2/14/2024     Pt will demonstrate 90/90 HS to -5 degrees on the left to improve hamstring mobility for daily functional tasks.  Eval Status: -15     Long Term Goals: To be accomplished in 8 weeks   Patient will improve FOTO score to 74 points to indicate significant improvement in functional status.  Eval Status: FOTO 65  FOTO score = an established functional score where 100 = no disability     Pt will report ability to sit for 30 minutes void of pain provocation to improve ease of comfort with daily tasks.  Eval Status: pain with sitting requiring frequent repositioning     Pt will have 5-/5 hamstring MMT strength to return to goals of recreational exercise without pain.  Eval Status: 4-/5     Patient will improve pain in left post thigh to 1/10 at worst to improve activity tolerance and restore prior level of function.  Eval Status: 10/10 at worst    PLAN  Yes  Continue plan of care  []  Upgrade activities as tolerated  []  Discharge due to:   []  Other:    Keron Monique, PT    2/14/2024    11:13 AM    Future Appointments   Date Time Provider Department Center   2/21/2024 11:30 AM Keron Monique, PT [facility]   2/23/2024  8:10 AM Keron Monique, PT [facility]   2/28/2024 10:10 AM Jagjit Valdez, PT [facility]   3/1/2024  9:30 AM Jagjit Valdez, PT [facility]   3/6/2024 10:10 AM Keron Monique, PT [facility]   3/8/2024  9:30 AM Jagjit Valdez, PT [facility]

## 2024-02-21 ENCOUNTER — HOSPITAL ENCOUNTER (OUTPATIENT)
Facility: HOSPITAL | Age: 74
Setting detail: RECURRING SERIES
Discharge: HOME | End: 2024-02-24
Payer: MEDICARE

## 2024-02-21 PROCEDURE — 97110 THERAPEUTIC EXERCISES: CPT

## 2024-02-21 PROCEDURE — 97140 MANUAL THERAPY 1/> REGIONS: CPT

## 2024-02-21 PROCEDURE — 97530 THERAPEUTIC ACTIVITIES: CPT

## 2024-02-21 PROCEDURE — 97112 NEUROMUSCULAR REEDUCATION: CPT

## 2024-02-21 NOTE — PROGRESS NOTES
PHYSICAL / OCCUPATIONAL THERAPY - DAILY TREATMENT NOTE (updated )    Patient Name: Jagjit Greer    Date: 2024    : 1950  Insurance: Payor: MEDICARE / Plan: MEDICARE PART A AND B / Product Type: *No Product type* /      Patient  verified Yes     Visit #   Current / Total 3 16   Time   In / Out 1129 1224   Pain   In / Out 0 0   Subjective Functional Status/Changes: \"I am feeling good. I have been careful and I haven't had any flare ups of the pain lately.\"   Changes to:  Meds, Allergies, Med Hx, Sx Hx?  If yes, update Summary List no       TREATMENT AREA =  Pain in left thigh [M79.652]    OBJECTIVE    Therapeutic Procedures:  Tx Min Billable or 1:1 Min (if diff from Tx Min) Procedure, Rationale, Specifics   17 15 [id number] Therapeutic Exercise (timed):  increase ROM, strength, coordination, balance, and proprioception to improve patient's ability to progress to PLOF and address remaining functional goals. (see flow sheet as applicable)     Details if applicable:       15  07436 Therapeutic Activity (timed):  use of dynamic activities replicating functional movements to increase ROM, strength, coordination, balance, and proprioception in order to improve patient's ability to progress to PLOF and address remaining functional goals.  (see flow sheet as applicable)     Details if applicable:     10  {91316 Manual Therapy (timed):  decrease pain, increase ROM, increase tissue extensibility, and increase postural awareness to improve patient's ability to progress to PLOF and address remaining functional goals.  The manual therapy interventions were performed at a separate and distinct time from the therapeutic activities interventions . (see flow sheet as applicable)      Details if applicable:  soft tissue mobilization prone and sidelying using roller and ball to proximal hamstrings.    13  05431 Neuromuscular Re-Education (timed):  improve balance, coordination, kinesthetic sense, posture, core

## 2024-02-23 ENCOUNTER — HOSPITAL ENCOUNTER (OUTPATIENT)
Facility: HOSPITAL | Age: 74
Setting detail: RECURRING SERIES
Discharge: HOME | End: 2024-02-26
Payer: MEDICARE

## 2024-02-23 PROCEDURE — 97110 THERAPEUTIC EXERCISES: CPT

## 2024-02-23 PROCEDURE — 97112 NEUROMUSCULAR REEDUCATION: CPT

## 2024-02-23 PROCEDURE — 97530 THERAPEUTIC ACTIVITIES: CPT

## 2024-02-23 PROCEDURE — 97140 MANUAL THERAPY 1/> REGIONS: CPT

## 2024-02-23 NOTE — PROGRESS NOTES
PHYSICAL / OCCUPATIONAL THERAPY - DAILY TREATMENT NOTE (updated )    Patient Name: Jagjit Greer    Date: 2024    : 1950  Insurance: Payor: MEDICARE / Plan: MEDICARE PART A AND B / Product Type: *No Product type* /      Patient  verified Yes     Visit #   Current / Total 4 16   Time   In / Out 0808 0905   Pain   In / Out 0 0   Subjective Functional Status/Changes: \"The hamstrings are starting to feel much more normal. But, I am still afraid it will flare up if I move the wrong way.\"   Changes to:  Meds, Allergies, Med Hx, Sx Hx?  If yes, update Summary List no       TREATMENT AREA =  Pain in left thigh [M79.652]    OBJECTIVE    Therapeutic Procedures:  Tx Min Billable or 1:1 Min (if diff from Tx Min) Procedure, Rationale, Specifics   20  77837 Therapeutic Exercise (timed):  increase ROM, strength, coordination, balance, and proprioception to improve patient's ability to progress to PLOF and address remaining functional goals. (see flow sheet as applicable)     Details if applicable:       15  43575 Therapeutic Activity (timed):  use of dynamic activities replicating functional movements to increase ROM, strength, coordination, balance, and proprioception in order to improve patient's ability to progress to PLOF and address remaining functional goals.  (see flow sheet as applicable)     Details if applicable:     12  20465 Neuromuscular Re-Education (timed):  improve balance, coordination, kinesthetic sense, posture, core stability and proprioception to improve patient's ability to develop conscious control of individual muscles and awareness of position of extremities in order to progress to PLOF and address remaining functional goals. (see flow sheet as applicable)     Details if applicable:     10  {95987 Manual Therapy (timed):  decrease pain, increase ROM, increase tissue extensibility, and increase postural awareness to improve patient's ability to progress to PLOF and address remaining

## 2024-02-28 ENCOUNTER — HOSPITAL ENCOUNTER (OUTPATIENT)
Facility: HOSPITAL | Age: 74
Setting detail: RECURRING SERIES
Discharge: HOME | End: 2024-03-02
Payer: MEDICARE

## 2024-02-28 PROCEDURE — 97140 MANUAL THERAPY 1/> REGIONS: CPT

## 2024-02-28 PROCEDURE — 97530 THERAPEUTIC ACTIVITIES: CPT

## 2024-02-28 PROCEDURE — 97110 THERAPEUTIC EXERCISES: CPT

## 2024-02-28 NOTE — PROGRESS NOTES
PHYSICAL / OCCUPATIONAL THERAPY - DAILY TREATMENT NOTE (updated )    Patient Name: Jagjit Greer    Date: 2024    : 1950  Insurance: Payor: MEDICARE / Plan: MEDICARE PART A AND B / Product Type: *No Product type* /      Patient  verified Yes     Visit #   Current / Total 5 16   Time   In / Out 12:48 pm 1:35 pm   Pain   In / Out 0 0   Subjective Functional Status/Changes: \"The hamstrings are starting to feel much more normal. But, I am still afraid it will flare up if I move the wrong way.\"   Changes to:  Meds, Allergies, Med Hx, Sx Hx?  If yes, update Summary List no       TREATMENT AREA =  Pain in left thigh [M79.652]    OBJECTIVE    Therapeutic Procedures:  Tx Min Billable or 1:1 Min (if diff from Tx Min) Procedure, Rationale, Specifics   10 10 44061 Therapeutic Exercise (timed):  increase ROM, strength, coordination, balance, and proprioception to improve patient's ability to progress to PLOF and address remaining functional goals. (see flow sheet as applicable)     Details if applicable:       17 12 56972 Therapeutic Activity (timed):  use of dynamic activities replicating functional movements to increase ROM, strength, coordination, balance, and proprioception in order to improve patient's ability to progress to PLOF and address remaining functional goals.  (see flow sheet as applicable)     Details if applicable:     12 10 76676 Neuromuscular Re-Education (timed):  improve balance, coordination, kinesthetic sense, posture, core stability and proprioception to improve patient's ability to develop conscious control of individual muscles and awareness of position of extremities in order to progress to PLOF and address remaining functional goals. (see flow sheet as applicable)     Details if applicable:     8 8 {75605 Manual Therapy (timed):  decrease pain, increase ROM, increase tissue extensibility, and increase postural awareness to improve patient's ability to progress to PLOF and address

## 2024-03-01 ENCOUNTER — HOSPITAL ENCOUNTER (OUTPATIENT)
Facility: HOSPITAL | Age: 74
Setting detail: RECURRING SERIES
Discharge: HOME | End: 2024-03-04
Payer: MEDICARE

## 2024-03-01 PROCEDURE — 97110 THERAPEUTIC EXERCISES: CPT

## 2024-03-01 PROCEDURE — 97530 THERAPEUTIC ACTIVITIES: CPT

## 2024-03-01 PROCEDURE — 97140 MANUAL THERAPY 1/> REGIONS: CPT

## 2024-03-01 NOTE — PROGRESS NOTES
PHYSICAL / OCCUPATIONAL THERAPY - DAILY TREATMENT NOTE (updated )    Patient Name: Jagjit Greer    Date: 3/1/2024    : 1950  Insurance: Payor: MEDICARE / Plan: MEDICARE PART A AND B / Product Type: *No Product type* /      Patient  verified Yes     Visit #   Current / Total 6 16   Time   In / Out 9:25 am 10:22 am   Pain   In / Out 0 0   Subjective Functional Status/Changes: Pt reports his hamstring was feeling fine but his hips were a little sore after last treatment. Notes no concerns today upon arrival.   Changes to:  Meds, Allergies, Med Hx, Sx Hx?  If yes, update Summary List no       TREATMENT AREA =  Pain in left thigh [M79.652]    OBJECTIVE    Therapeutic Procedures:  Tx Min Billable or 1:1 Min (if diff from Tx Min) Procedure, Rationale, Specifics   10 10 31479 Therapeutic Exercise (timed):  increase ROM, strength, coordination, balance, and proprioception to improve patient's ability to progress to PLOF and address remaining functional goals. (see flow sheet as applicable)     Details if applicable:       15 10 49366 Therapeutic Activity (timed):  use of dynamic activities replicating functional movements to increase ROM, strength, coordination, balance, and proprioception in order to improve patient's ability to progress to PLOF and address remaining functional goals.  (see flow sheet as applicable)     Details if applicable:     12 10 80403 Neuromuscular Re-Education (timed):  improve balance, coordination, kinesthetic sense, posture, core stability and proprioception to improve patient's ability to develop conscious control of individual muscles and awareness of position of extremities in order to progress to PLOF and address remaining functional goals. (see flow sheet as applicable)     Details if applicable:     10 10 {89775 Manual Therapy (timed):  decrease pain, increase ROM, increase tissue extensibility, and increase postural awareness to improve patient's ability to progress to

## 2024-03-06 ENCOUNTER — HOSPITAL ENCOUNTER (OUTPATIENT)
Facility: HOSPITAL | Age: 74
Setting detail: RECURRING SERIES
Discharge: HOME | End: 2024-03-09
Payer: MEDICARE

## 2024-03-06 PROCEDURE — 97140 MANUAL THERAPY 1/> REGIONS: CPT

## 2024-03-06 PROCEDURE — 97530 THERAPEUTIC ACTIVITIES: CPT

## 2024-03-06 PROCEDURE — 97110 THERAPEUTIC EXERCISES: CPT

## 2024-03-06 PROCEDURE — 97112 NEUROMUSCULAR REEDUCATION: CPT

## 2024-03-06 NOTE — PROGRESS NOTES
PHYSICAL / OCCUPATIONAL THERAPY - DAILY TREATMENT NOTE (updated )    Patient Name: Jagjit Greer    Date: 3/6/2024    : 1950  Insurance: Payor: MEDICARE / Plan: MEDICARE PART A AND B / Product Type: *No Product type* /      Patient  verified Yes     Visit #   Current / Total 7 16   Time   In / Out 1005 1107   Pain   In / Out 0 0   Subjective Functional Status/Changes: \"I am feeling much better. It now feels almost like I never had an injury. I think I might be ready to stop after next session.\"   Changes to:  Meds, Allergies, Med Hx, Sx Hx?  If yes, update Summary List no       TREATMENT AREA =  Pain in left thigh [M79.652]    OBJECTIVE    Therapeutic Procedures:  Tx Min Billable or 1:1 Min (if diff from Tx Min) Procedure, Rationale, Specifics   22  43889 Therapeutic Exercise (timed):  increase ROM, strength, coordination, balance, and proprioception to improve patient's ability to progress to PLOF and address remaining functional goals. (see flow sheet as applicable)     Details if applicable:       15  31818 Therapeutic Activity (timed):  use of dynamic activities replicating functional movements to increase ROM, strength, coordination, balance, and proprioception in order to improve patient's ability to progress to PLOF and address remaining functional goals.  (see flow sheet as applicable)     Details if applicable:     15  44529 Neuromuscular Re-Education (timed):  improve balance, coordination, kinesthetic sense, posture, core stability and proprioception to improve patient's ability to develop conscious control of individual muscles and awareness of position of extremities in order to progress to PLOF and address remaining functional goals. (see flow sheet as applicable)     Details if applicable:     10  24660 Manual Therapy (timed):  decrease pain, increase ROM, and increase tissue extensibility to improve patient's ability to progress to PLOF and address remaining functional goals.  The

## 2024-03-08 ENCOUNTER — HOSPITAL ENCOUNTER (OUTPATIENT)
Facility: HOSPITAL | Age: 74
Setting detail: RECURRING SERIES
Discharge: HOME | End: 2024-03-11
Payer: MEDICARE

## 2024-03-08 PROCEDURE — 97530 THERAPEUTIC ACTIVITIES: CPT

## 2024-03-08 PROCEDURE — 97112 NEUROMUSCULAR REEDUCATION: CPT

## 2024-03-08 PROCEDURE — 97110 THERAPEUTIC EXERCISES: CPT

## 2024-03-08 NOTE — PROGRESS NOTES
Physical Therapy Discharge Instructions    [facility]  [address] [address]  Phone: [phone]   Fax: [phone]      Patient: Jagjit Greer  : 1950    Continue Home Exercise Program 2-3 times/week      Continue with    [] Ice  as needed      [x] Heat           Follow up with MD:     [] Upon completion of therapy     [x] As needed      Recommendations:     [x]   Return to activity with home program    []   Return to activity with the following modifications:       []Post Rehab Program    []Join Independent aquatic program     []Return to/join local gym      Additional Comments: Please contact clinic at above phone number if you have any questions regarding Home Exercise Program or self care instructions.    
goals of recreational exercise without pain.  Eval Status: 4-/5  Current: Near met 4+/5 void of pain 3/8/24     Patient will improve pain in left post thigh to 1/10 at worst to improve activity tolerance and restore prior level of function.  Eval Status: 10/10 at worst  Current: pt reports no pain lately noting no issues with prolonged sitting or exercise, MET 3/8/24    Assessment/ Summary of Care: Pt has attended 8 PT appts to date demonstrating significant improvement in hamstring mobility, strength, and reports improvements in function and abolishment of pain with activity. At this time he has been instructed in HEP for self management and continued strengthening to facilitate gradual return to active PLOF. DC from skilled PT at this time.     RECOMMENDATIONS:  [x]Discontinue therapy: [x]Patient has reached or is progressing toward set goals      []Patient is non-compliant or has abdicated      []Due to lack of appreciable progress towards set goals    Jagjit Valdez, PT 3/8/2024 10:28 AM    
significant improvement in functional status.  Eval Status: FOTO 65  Current: 77       MET, 3/6/2024  FOTO score = an established functional score where 100 = no disability     Pt will report ability to sit for 30 minutes void of pain provocation to improve ease of comfort with daily tasks.  Eval Status: pain with sitting requiring frequent repositioning  Current: Pt reports no pain with prolonged sitting, MET 3/8/24     Pt will have 5-/5 hamstring MMT strength to return to goals of recreational exercise without pain.  Eval Status: 4-/5  Current: Near met 4+/5 void of pain 3/8/24     Patient will improve pain in left post thigh to 1/10 at worst to improve activity tolerance and restore prior level of function.  Eval Status: 10/10 at worst  Current: pt reports no pain lately noting no issues with prolonged sitting or exercise, MET 3/8/24    PLAN  No  Continue plan of care  []  Upgrade activities as tolerated  [x]  Discharge due to: Goals met or near met, pt reports he has the tools he needs to self manage  []  Other:    Jagjit Valdez, PT    3/8/2024    9:57 AM    No future appointments.

## 2024-10-31 NOTE — PROGRESS NOTES
Nasal saline gel for nose at night   If bloody nose is lasting longer than 30 minutes or is a heavy flow or large clots go to hospital   Drinks lots of fluids, use tylenol every 6 hours as needed for temperature 100.5 or above.    PT DAILY TREATMENT NOTE - [facility]     Patient Name: Kristopher Gr  Date:2022  : 1950  [x]  Patient  Verified  Payor: Maryann Maldonado / [place] / Product Type: Medicare /    In time:1315  Out time:1400  Total Treatment Time (min): 45  Total Timed Codes (min): 45   1:1 Treatment Time ( [address] only): 45   Visit #: 4 of 16    Treatment Area: Other low back pain [M54.59]    SUBJECTIVE  Pain Level (0-10 scale): 0  Any medication changes, allergies to medications, adverse drug reactions, diagnosis change, or new procedure performed?: [x] No    [] Yes (see summary sheet for update)  Subjective functional status/changes:   [] No changes reported  Patient reports that his back pain is improving. OBJECTIVE    20 min Therapeutic Exercise:  [x] See flow sheet :   Rationale: increase ROM, increase strength and decrease pain to improve the patients ability to complete ADLs    15 min Therapeutic Activity:  [x]  See flow sheet :   Rationale: increase ROM, increase strength and improve coordination  to improve the patients ability to complete ADLs     10 min Neuromuscular Re-education:  [x]  See flow sheet :   Rationale: improve coordination, improve balance and increase proprioception  to improve the patients ability to complete ADLs        With   [x] TE   [] TA   [] neuro   [] other: Patient Education: [x] Review HEP    [] Progressed/Changed HEP based on:   [] positioning   [] body mechanics   [] transfers   [] heat/ice application    [] other:      Other Objective/Functional Measures: NA     Pain Level (0-10 scale) post treatment: 0    ASSESSMENT/Changes in Function: Patient responds well to treatment session. Advanced exercise by introducing balance exercise. He was challenged as he required UEA to maintain balance on compliant surface. Patient continues to respond well to leg press reporting reduction in symptoms. Will advance exercise as tolerated.  No adverse effects were noted from today's treatment session      Patient will continue to benefit from skilled PT services to modify and progress therapeutic interventions, address functional mobility deficits, address ROM deficits, address strength deficits, analyze and address soft tissue restrictions, analyze and cue movement patterns, analyze and modify body mechanics/ergonomics, assess and modify postural abnormalities, address imbalance and instruct in home and community integration to attain remaining goals. []  See Plan of Care  []  See progress note/recertification  []  See Discharge Summary         Progress towards goals / Updated goals:    Short Term Goals: To be accomplished in 4 weeks:                 Patient will report compliance with HEP 1x/day to aid in rehabilitation program.                 Status at IE: Provided initial HEP                 Current: In-progress, reviewed HEP to proper good carryover at home, 11/15/2022     Long Term Goals: To be accomplished in 8 weeks:                 Patient will increase bilateral LE strength to 5/5 MMT throughout to aid in completion of ADLs. Status at IE:4-/5                 Current: In-progress, 4/5, 11/22/2022                    Patient will report pain no greater than 0-2/10 throughout entire day to aid in completion of ADLs. Status at IE:1-10/10                 Current:Same as IE                    Patient will perform 10 pain free kettle bell squats with 15lbs with good form/technique to aid in completion of ADLs. Status at [address] with sit to stand transfer                 Current: In-progress, 2 x 10 with 7# and 0/10 pain, 11/29/2022                    Patient will improve FOTO score to 74 points to demonstrate improvement in functional status.                  Status at IE:FOTO score = 67 (an established functional score where 100 = no disability)                 Current: Same as IE    PLAN  []  Upgrade activities as tolerated     [x] Continue plan of care  []  Update interventions per flow sheet       []  Discharge due to:_  []  Other:_      Nasir Flowers, PT, DPT 12/1/2022  1:27 PM    Future Appointments   Date Time Provider [place]   12/5/2022  8:45 AM Bernadette Weber, [facility]   12/21/2022 10:15 AM Victor Manuel Ayala, [facility]

## (undated) DEVICE — SET ADMIN 16ML TBNG L100IN 2 Y INJ SITE IV PIGGY BK DISP

## (undated) DEVICE — CANNULA CUSH AD W/ 14FT TBG

## (undated) DEVICE — FORCEPS BX CAP 240CM L RAD JAW 4

## (undated) DEVICE — SPONGE GZ W4XL4IN COT 12 PLY TYP VII WVN C FLD DSGN

## (undated) DEVICE — TRNQT TEXT 1X18IN BLU LF DISP -- CONVERT TO ITEM 362165

## (undated) DEVICE — SINGLE PORT MANIFOLD: Brand: NEPTUNE 2

## (undated) DEVICE — SYR 5ML 1/5 GRAD LL NSAF LF --

## (undated) DEVICE — NDL FLTR TIP 5 MIC 18GX1.5IN --

## (undated) DEVICE — KENDALL RADIOLUCENT FOAM MONITORING ELECTRODE RECTANGULAR SHAPE: Brand: KENDALL

## (undated) DEVICE — Device

## (undated) DEVICE — CATH IV SAFE STR 22GX1IN BLU -- PROTECTIV PLUS

## (undated) DEVICE — SYR 3ML LL TIP 1/10ML GRAD --

## (undated) DEVICE — TRAP SPEC COLL POLYP POLYSTYR --

## (undated) DEVICE — NDL PRT INJ NSAF BLNT 18GX1.5 --

## (undated) DEVICE — CATH SUC CTRL PRT TRIFLO 14FR --

## (undated) DEVICE — SPONGE GZ W4XL4IN RAYON POLY 4 PLY NONWOVEN FASTER WICKING

## (undated) DEVICE — MAJ-1414 SINGLE USE ADPATER BIOPSY VALV: Brand: SINGLE USE ADAPTOR BIOPSY VALVE

## (undated) DEVICE — WRISTBAND ID AD W2.5XL9.5CM RED VYN ADH CLSR UNI-PRINT

## (undated) DEVICE — SOLUTION IV 500ML 0.9% SOD CHL FLX CONT

## (undated) DEVICE — GARMENT,MEDLINE,DVT,INT,CALF,MED, GEN2: Brand: MEDLINE

## (undated) DEVICE — TUBING, SUCTION, 1/4" X 12', STRAIGHT: Brand: MEDLINE

## (undated) DEVICE — SYRINGE 50ML E/T